# Patient Record
Sex: FEMALE | Race: WHITE | Employment: OTHER | URBAN - METROPOLITAN AREA
[De-identification: names, ages, dates, MRNs, and addresses within clinical notes are randomized per-mention and may not be internally consistent; named-entity substitution may affect disease eponyms.]

---

## 2017-04-10 ENCOUNTER — HOSPITAL LABORATORY (OUTPATIENT)
Dept: OTHER | Facility: CLINIC | Age: 67
End: 2017-04-10

## 2017-04-10 LAB
CREAT SERPL-MCNC: 0.72 MG/DL (ref 0.52–1.04)
ERYTHROCYTE [DISTWIDTH] IN BLOOD BY AUTOMATED COUNT: 17.7 % (ref 10–15)
ERYTHROCYTE [DISTWIDTH] IN BLOOD BY AUTOMATED COUNT: NORMAL % (ref 10–15)
FERRITIN SERPL-MCNC: 5 NG/ML (ref 8–252)
GFR SERPL CREATININE-BSD FRML MDRD: 82 ML/MIN/1.7M2
HCT VFR BLD AUTO: 28.1 % (ref 35–47)
HCT VFR BLD AUTO: NORMAL % (ref 35–47)
HGB BLD-MCNC: 8.2 G/DL (ref 11.7–15.7)
HGB BLD-MCNC: NORMAL G/DL (ref 11.7–15.7)
HGB BLD-MCNC: NORMAL G/DL (ref 11.7–15.7)
IRON SATN MFR SERPL: 3 % (ref 15–46)
IRON SERPL-MCNC: 11 UG/DL (ref 35–180)
MCH RBC QN AUTO: 25.4 PG (ref 26.5–33)
MCH RBC QN AUTO: NORMAL PG (ref 26.5–33)
MCHC RBC AUTO-ENTMCNC: 29.5 G/DL (ref 31.5–36.5)
MCHC RBC AUTO-ENTMCNC: NORMAL G/DL (ref 31.5–36.5)
MCV RBC AUTO: 86 FL (ref 78–100)
MCV RBC AUTO: NORMAL FL (ref 78–100)
PLATELET # BLD AUTO: 368 10E9/L (ref 150–450)
PLATELET # BLD AUTO: NORMAL 10E9/L (ref 150–450)
RBC # BLD AUTO: 3.27 10E12/L (ref 3.8–5.2)
RBC # BLD AUTO: NORMAL 10E12/L (ref 3.8–5.2)
RETICS # AUTO: 56.6 10E9/L (ref 25–95)
RETICS # AUTO: NORMAL 10E9/L (ref 25–95)
RETICS/RBC NFR AUTO: 1.7 % (ref 0.5–2)
RETICS/RBC NFR AUTO: NORMAL % (ref 0.5–2)
TIBC SERPL-MCNC: 431 UG/DL (ref 240–430)
WBC # BLD AUTO: 5.5 10E9/L (ref 4–11)
WBC # BLD AUTO: NORMAL 10E9/L (ref 4–11)

## 2017-04-11 ENCOUNTER — TRANSFERRED RECORDS (OUTPATIENT)
Dept: HEALTH INFORMATION MANAGEMENT | Facility: CLINIC | Age: 67
End: 2017-04-11

## 2017-04-13 DIAGNOSIS — D64.9 ANEMIA, UNSPECIFIED: Primary | ICD-10-CM

## 2017-04-13 PROBLEM — K91.2 POSTSURGICAL NONABSORPTION: Status: ACTIVE | Noted: 2017-04-13

## 2017-04-13 PROBLEM — Z29.89 NEED FOR PROPHYLACTIC IMMUNOTHERAPY: Status: ACTIVE | Noted: 2017-04-13

## 2017-04-13 PROBLEM — K90.9 IMPAIRED INTESTINAL ABSORPTION: Status: ACTIVE | Noted: 2017-04-13

## 2017-04-15 ENCOUNTER — INFUSION THERAPY VISIT (OUTPATIENT)
Dept: INFUSION THERAPY | Facility: CLINIC | Age: 67
End: 2017-04-15
Attending: PATHOLOGY
Payer: MEDICARE

## 2017-04-15 VITALS — HEART RATE: 54 BPM | DIASTOLIC BLOOD PRESSURE: 67 MMHG | SYSTOLIC BLOOD PRESSURE: 134 MMHG | RESPIRATION RATE: 18 BRPM

## 2017-04-15 DIAGNOSIS — K90.9 IMPAIRED INTESTINAL ABSORPTION: ICD-10-CM

## 2017-04-15 DIAGNOSIS — K91.2 POSTSURGICAL NONABSORPTION: ICD-10-CM

## 2017-04-15 DIAGNOSIS — D64.9 ANEMIA, UNSPECIFIED: Primary | ICD-10-CM

## 2017-04-15 PROCEDURE — 25000128 H RX IP 250 OP 636: Performed by: PATHOLOGY

## 2017-04-15 PROCEDURE — 96365 THER/PROPH/DIAG IV INF INIT: CPT

## 2017-04-15 RX ADMIN — FERRIC CARBOXYMALTOSE INJECTION 750 MG: 50 INJECTION, SOLUTION INTRAVENOUS at 12:52

## 2017-04-15 RX ADMIN — SODIUM CHLORIDE 250 ML: 9 INJECTION, SOLUTION INTRAVENOUS at 12:52

## 2017-04-15 NOTE — MR AVS SNAPSHOT
After Visit Summary   4/15/2017    Amalia Britton    MRN: [id number]           Patient Information     Date Of Birth          1950        Visit Information        Provider Department      4/15/2017 12:30 PM  INFUSION CHAIR 16 [facility]        Today's Diagnoses     Anemia, unspecified    -  1    Impaired intestinal absorption        Postsurgical nonabsorption           Follow-ups after your visit        Your next 10 appointments already scheduled     Apr 16, 2017 11:30 AM CDT   Level 1 with  INFUSION CHAIR 16   [facility] ([facility])    [facility]  [address]  [place] [address]   [phone]            Apr 22, 2017 11:30 AM CDT   Level 2 with  INFUSION CHAIR 12   [facility] ([facility])    [facility]  [address]  [place] [id number] [phone]              Who to contact     If you have questions or need follow up information about today's clinic visit or your schedule please contact [facility] AND [facility] directly at [phone].  Normal or non-critical lab and imaging results will be communicated to you by [employer], letter or phone within 4 business days after the clinic has received the results. If you do not hear from us within 7 days, please contact the clinic through [employer] or phone. If you have a critical or abnormal lab result, we will notify you by phone as soon as possible.  Submit refill requests through [employer] or call your pharmacy and they will forward the refill request to us. Please allow 3 business days for your refill to be completed.          Additional Information About Your Visit        MyChart Information     [employer] lets you send messages to your doctor, view your test results, renew your prescriptions, schedule appointments and more. To sign  "up, go to [email or web address] . Click on \"Log in\" on the left side of the screen, which will take you to the Welcome page. Then click on \"Sign up Now\" on the right side of the page.     You will be asked to enter the access code listed below, as well as some personal information. Please follow the directions to create your username and password.     Your access code is: [email or web address]  Expires: 2017  1:44 PM     Your access code will  in 90 days. If you need help or a new code, please call your [facility] clinic or [phone].        Care EveryWhere ID     This is your Care EveryWhere ID. This could be used by other organizations to access your [facility] medical records  [id number]        Your Vitals Were     Pulse Respirations                54 18           Blood Pressure from Last 3 Encounters:   04/15/17 134/67   14 112/63   14 146/77    Weight from Last 3 Encounters:   14 64.2 kg (141 lb 9.6 oz)   14 66.9 kg (147 lb 8 oz)              We Performed the Following     Treatment Conditions        Primary Care Provider Office Phone # Fax #    Robin Martin [phone]       [facility] [address] [place] [id number]        Thank you!     Thank you for choosing [facility] AND [facility]  for your care. Our goal is always to provide you with excellent care. Hearing back from our patients is one way we can continue to improve our services. Please take a few minutes to complete the written survey that you may receive in the mail after your visit with us. Thank you!             Your Updated Medication List - Protect others around you: Learn how to safely use, store and throw away your medicines at www.disposemymeds.org.          This list is accurate as of: 4/15/17  1:45 PM.  Always use your most recent med list.                   Brand Name Dispense Instructions for use    baclofen 10 MG tablet    LIORESAL     Take 15 mg by " mouth 2 times daily 1.5 tab three times /day       CYANOCOBALAMIN PO      Take 1,000 mcg by mouth daily       EPINEPHrine 0.3 MG/0.3ML injection      Inject 0.3 mg into the muscle once as needed for anaphylaxis       ESTRACE PO      Take 1 mg by mouth daily       FOSAMAX PO      Take 70 mg by mouth once a week       HYDROcodone-acetaminophen 5-325 MG per tablet    NORCO    25 tablet    Take 1 tablet by mouth every 6 hours as needed for moderate to severe pain       LAMICTAL PO      Take 100 mg by mouth 2 times daily       Multi-vitamin Tabs tablet      Take 1 tablet by mouth daily       oxyCODONE-acetaminophen 5-325 MG per tablet    PERCOCET    30 tablet    Take 1-2 tablets by mouth every 6 hours as needed for moderate to severe pain       SYNTHROID PO      Take 100 mcg by mouth daily       TRAZODONE HCL PO      Take 200 mg by mouth At Bedtime       VALIUM PO      Take 10 mg by mouth once       VITAMIN C PO      Take 1,000 mg by mouth daily       vitamin D 69463 UNIT capsule    ERGOCALCIFEROL     Take 50,000 Units by mouth every 7 days

## 2017-04-15 NOTE — PROGRESS NOTES
Infusion Nursing Note:  Amalia Britton presents today for 1st injectafer.    Patient seen by provider today: No   present during visit today: Not Applicable.    Note: Reviewed injectafer and procrit drug information, possible side effects.  Consents signed for both injectafer and procrit.    Spoke with rj Palmer to administer procrit tomorrow.    Intravenous Access:  Peripheral IV placed.    Treatment Conditions:  Not Applicable.      Post Infusion Assessment:  Patient tolerated infusion without incident.  Patient observed for 30 minutes post injectafer per protocol.  Blood return noted pre and post infusion.  Site patent and intact, free from redness, edema or discomfort.  No evidence of extravasations.  Access discontinued per protocol.    Discharge Plan:   Discharge instructions reviewed with: Patient.  Patient and/or family verbalized understanding of discharge instructions and all questions answered.  Copy of AVS reviewed with patient and/or family.  Patient will return tomorrow (4/16/17) for next appointment.  Patient discharged in stable condition accompanied by: self.  Departure Mode: Ambulatory.    CASSY Epps RN (discharge)

## 2017-04-16 ENCOUNTER — INFUSION THERAPY VISIT (OUTPATIENT)
Dept: INFUSION THERAPY | Facility: CLINIC | Age: 67
End: 2017-04-16
Attending: PATHOLOGY
Payer: MEDICARE

## 2017-04-16 VITALS — DIASTOLIC BLOOD PRESSURE: 65 MMHG | SYSTOLIC BLOOD PRESSURE: 117 MMHG | HEART RATE: 80 BPM | TEMPERATURE: 98.1 F

## 2017-04-16 DIAGNOSIS — D64.9 ANEMIA, UNSPECIFIED: Primary | ICD-10-CM

## 2017-04-16 DIAGNOSIS — Z29.89 NEED FOR PROPHYLACTIC IMMUNOTHERAPY: ICD-10-CM

## 2017-04-16 PROCEDURE — 63400005 ZZH RX 634: Performed by: PATHOLOGY

## 2017-04-16 PROCEDURE — 96372 THER/PROPH/DIAG INJ SC/IM: CPT | Mod: XS

## 2017-04-16 PROCEDURE — 96360 HYDRATION IV INFUSION INIT: CPT

## 2017-04-16 RX ADMIN — ERYTHROPOIETIN 40000 UNITS: 40000 INJECTION, SOLUTION INTRAVENOUS; SUBCUTANEOUS at 12:01

## 2017-04-16 ASSESSMENT — PAIN SCALES - GENERAL: PAINLEVEL: EXTREME PAIN (8)

## 2017-04-16 NOTE — PROGRESS NOTES
Infusion Nursing Note:  Amalia Britton presents today for procrit.    Patient seen by provider today: No   present during visit today: Not Applicable.    Note: N/A.    Intravenous Access:  No Intravenous access/labs at this visit.    Treatment Conditions:   consent signed 4/15/17.      Post Infusion Assessment:  Patient tolerated injection without incident.  Site patent and intact, free from redness, edema or discomfort.    Discharge Plan:   Copy of AVS reviewed with patient and/or family. Patient discharged in stable condition accompanied by: self.  Departure Mode: Ambulatory with cane.    Loreto Fletcher RN

## 2017-04-16 NOTE — MR AVS SNAPSHOT
"              After Visit Summary   4/16/2017    Amalia Britton    MRN: [id number]           Patient Information     Date Of Birth          1950        Visit Information        Provider Department      4/16/2017 11:30 AM  INFUSION CHAIR 16 [facility]        Today's Diagnoses     Anemia, unspecified    -  1    Need for prophylactic immunotherapy           Follow-ups after your visit        Your next 10 appointments already scheduled     Apr 22, 2017 11:30 AM CDT   Level 2 with  INFUSION CHAIR 12   [facility] ([facility])    [facility] [place]  [address]  [place] [id number] [phone]              Who to contact     If you have questions or need follow up information about today's clinic visit or your schedule please contact [facility] directly at [phone].  Normal or non-critical lab and imaging results will be communicated to you by [employer], letter or phone within 4 business days after the clinic has received the results. If you do not hear from us within 7 days, please contact the clinic through [employer] or phone. If you have a critical or abnormal lab result, we will notify you by phone as soon as possible.  Submit refill requests through [employer] or call your pharmacy and they will forward the refill request to us. Please allow 3 business days for your refill to be completed.          Additional Information About Your Visit        MyChart Information     [employer] lets you send messages to your doctor, view your test results, renew your prescriptions, schedule appointments and more. To sign up, go to [email or web address] . Click on \"Log in\" on the left side of the screen, which will take you to the Welcome page. Then click on \"Sign up Now\" on the right side of the page.     You will be asked to enter the access code listed below, as well as some personal " information. Please follow the directions to create your username and password.     Your access code is: [email or web address]  Expires: 2017  1:44 PM     Your access code will  in 90 days. If you need help or a new code, please call your [facility] or [phone].        Care EveryWhere ID     This is your Care EveryWhere ID. This could be used by other organizations to access your [facility] medical records  [id number]        Your Vitals Were     Pulse Temperature                80 98.1  F (36.7  C) (Oral)           Blood Pressure from Last 3 Encounters:   17 117/65   04/15/17 134/67   14 112/63    Weight from Last 3 Encounters:   14 64.2 kg (141 lb 9.6 oz)   14 66.9 kg (147 lb 8 oz)              Today, you had the following     No orders found for display       Primary Care Provider Office Phone # Fax #    Robin Martin [phone]       [facility] [address] [facility] [address]        Thank you!     Thank you for choosing [facility]  for your care. Our goal is always to provide you with excellent care. Hearing back from our patients is one way we can continue to improve our services. Please take a few minutes to complete the written survey that you may receive in the mail after your visit with us. Thank you!             Your Updated Medication List - Protect others around you: Learn how to safely use, store and throw away your medicines at www.disposemymeds.org.          This list is accurate as of: 17 12:04 PM.  Always use your most recent med list.                   Brand Name Dispense Instructions for use    baclofen 10 MG tablet    LIORESAL     Take 15 mg by mouth 2 times daily 1.5 tab three times /day       CYANOCOBALAMIN PO      Take 1,000 mcg by mouth daily       EPINEPHrine 0.3 MG/0.3ML injection      Inject 0.3 mg into the muscle once as needed for anaphylaxis       ESTRACE PO      Take 1 mg by  mouth daily       FOSAMAX PO      Take 70 mg by mouth once a week       HYDROcodone-acetaminophen 5-325 MG per tablet    NORCO    25 tablet    Take 1 tablet by mouth every 6 hours as needed for moderate to severe pain       LAMICTAL PO      Take 100 mg by mouth 2 times daily       Multi-vitamin Tabs tablet      Take 1 tablet by mouth daily       oxyCODONE-acetaminophen 5-325 MG per tablet    PERCOCET    30 tablet    Take 1-2 tablets by mouth every 6 hours as needed for moderate to severe pain       SYNTHROID PO      Take 100 mcg by mouth daily       TRAZODONE HCL PO      Take 200 mg by mouth At Bedtime       VALIUM PO      Take 10 mg by mouth once       VITAMIN C PO      Take 1,000 mg by mouth daily       vitamin D 26863 UNIT capsule    ERGOCALCIFEROL     Take 50,000 Units by mouth every 7 days

## 2017-04-22 ENCOUNTER — INFUSION THERAPY VISIT (OUTPATIENT)
Dept: INFUSION THERAPY | Facility: CLINIC | Age: 67
End: 2017-04-22
Attending: PATHOLOGY
Payer: MEDICARE

## 2017-04-22 VITALS
TEMPERATURE: 97.2 F | SYSTOLIC BLOOD PRESSURE: 124 MMHG | HEART RATE: 97 BPM | RESPIRATION RATE: 14 BRPM | DIASTOLIC BLOOD PRESSURE: 55 MMHG

## 2017-04-22 DIAGNOSIS — K90.9 IMPAIRED INTESTINAL ABSORPTION: ICD-10-CM

## 2017-04-22 DIAGNOSIS — K91.2 POSTSURGICAL NONABSORPTION: ICD-10-CM

## 2017-04-22 DIAGNOSIS — D64.9 ANEMIA, UNSPECIFIED: Primary | ICD-10-CM

## 2017-04-22 PROCEDURE — 25000128 H RX IP 250 OP 636: Performed by: PATHOLOGY

## 2017-04-22 RX ADMIN — SODIUM CHLORIDE 250 ML: 9 INJECTION, SOLUTION INTRAVENOUS at 12:05

## 2017-04-22 RX ADMIN — FERRIC CARBOXYMALTOSE INJECTION 750 MG: 50 INJECTION, SOLUTION INTRAVENOUS at 12:05

## 2017-04-22 ASSESSMENT — PAIN SCALES - GENERAL: PAINLEVEL: WORST PAIN (10)

## 2017-04-22 NOTE — PROGRESS NOTES
Infusion Nursing Note:  Amalia Britton presents today for last dose Injectafer.    Patient seen by provider today: No   present during visit today: Not Applicable.    Note: Only change to report is recent rupture of Bakers cyst behind left knee with pain and swelling. Was seen in the ER for this last week..    Intravenous Access:  Peripheral IV placed.    Treatment Conditions:  Not Applicable.      Post Infusion Assessment:  Patient tolerated infusion without incident.  Patient observed for 30 minutes post Injectafer per protocol.  Blood return noted pre and post infusion.  Site patent and intact, free from redness, edema or discomfort.  No evidence of extravasations.  Access discontinued per protocol.    Discharge Plan:   Discharge instructions reviewed with: Patient.  Patient and/or family verbalized understanding of discharge instructions and all questions answered.  Copy of AVS reviewed with patient and/or family.  Patient will return NONE NEEDED for next appointment.  Patient discharged in stable condition accompanied by: self.  Departure Mode: Ambulatory.    Tamiko Hogan RN

## 2017-04-22 NOTE — MR AVS SNAPSHOT
"              After Visit Summary   4/22/2017    Amalia Britton    MRN: [id number]           Patient Information     Date Of Birth          1950        Visit Information        Provider Department      4/22/2017 11:30 AM  INFUSION CHAIR 12 [facility]        Today's Diagnoses     Anemia, unspecified    -  1    Impaired intestinal absorption        Postsurgical nonabsorption           Follow-ups after your visit        Your next 10 appointments already scheduled     Tyshawn 15, 2017   Procedure with Balta Hoffman MD   [facility] (--)    [address]  [facility] [address] [phone]              Who to contact     If you have questions or need follow up information about today's clinic visit or your schedule please contact [facility] directly at [phone].  Normal or non-critical lab and imaging results will be communicated to you by [employer], letter or phone within 4 business days after the clinic has received the results. If you do not hear from us within 7 days, please contact the clinic through [employer] or phone. If you have a critical or abnormal lab result, we will notify you by phone as soon as possible.  Submit refill requests through [employer] or call your pharmacy and they will forward the refill request to us. Please allow 3 business days for your refill to be completed.          Additional Information About Your Visit        MyChart Information     [employer] lets you send messages to your doctor, view your test results, renew your prescriptions, schedule appointments and more. To sign up, go to [email or web address] . Click on \"Log in\" on the left side of the screen, which will take you to the Welcome page. Then click on \"Sign up Now\" on the right side of the page.     You will be asked to enter the access code listed below, as well as some personal information. Please follow the directions to " create your username and password.     Your access code is: [email or web address]  Expires: 2017  1:44 PM     Your access code will  in 90 days. If you need help or a new code, please call your [facility] or [phone].        Care EveryWhere ID     This is your Care EveryWhere ID. This could be used by other organizations to access your [facility] medical records  [id number]        Your Vitals Were     Pulse Temperature Respirations             98 98.4  F (36.9  C) (Oral) 18          Blood Pressure from Last 3 Encounters:   17 141/66   17 117/65   04/15/17 134/67    Weight from Last 3 Encounters:   14 64.2 kg (141 lb 9.6 oz)   14 66.9 kg (147 lb 8 oz)              We Performed the Following     Treatment Conditions        Primary Care Provider Office Phone # Fax #    Robin Martin [phone]       [facility] [address] [facility] [address]        Thank you!     Thank you for choosing [facility]  for your care. Our goal is always to provide you with excellent care. Hearing back from our patients is one way we can continue to improve our services. Please take a few minutes to complete the written survey that you may receive in the mail after your visit with us. Thank you!             Your Updated Medication List - Protect others around you: Learn how to safely use, store and throw away your medicines at www.disposemymeds.org.          This list is accurate as of: 17  1:12 PM.  Always use your most recent med list.                   Brand Name Dispense Instructions for use    baclofen 10 MG tablet    LIORESAL     Take 15 mg by mouth 2 times daily 1.5 tab three times /day       CYANOCOBALAMIN PO      Take 1,000 mcg by mouth daily       EPINEPHrine 0.3 MG/0.3ML injection      Inject 0.3 mg into the muscle once as needed for anaphylaxis       ESTRACE PO      Take 1 mg by mouth daily       FOSAMAX PO      Take  70 mg by mouth once a week       HYDROcodone-acetaminophen 5-325 MG per tablet    NORCO    25 tablet    Take 1 tablet by mouth every 6 hours as needed for moderate to severe pain       LAMICTAL PO      Take 100 mg by mouth 2 times daily       Multi-vitamin Tabs tablet      Take 1 tablet by mouth daily       oxyCODONE-acetaminophen 5-325 MG per tablet    PERCOCET    30 tablet    Take 1-2 tablets by mouth every 6 hours as needed for moderate to severe pain       SYNTHROID PO      Take 100 mcg by mouth daily       TRAZODONE HCL PO      Take 200 mg by mouth At Bedtime       VALIUM PO      Take 10 mg by mouth once       VITAMIN C PO      Take 1,000 mg by mouth daily       vitamin D 21909 UNIT capsule    ERGOCALCIFEROL     Take 50,000 Units by mouth every 7 days

## 2017-05-23 ENCOUNTER — TRANSFERRED RECORDS (OUTPATIENT)
Dept: HEALTH INFORMATION MANAGEMENT | Facility: CLINIC | Age: 67
End: 2017-05-23

## 2017-05-25 ENCOUNTER — TELEPHONE (OUTPATIENT)
Dept: ONCOLOGY | Facility: CLINIC | Age: 67
End: 2017-05-25

## 2017-05-26 DIAGNOSIS — Z29.89 NEED FOR PROPHYLACTIC IMMUNOTHERAPY: Primary | ICD-10-CM

## 2017-05-26 PROBLEM — T45.4X5A ADVERSE EFFECT OF IRON OR ITS COMPOUND: Status: ACTIVE | Noted: 2017-05-26

## 2017-06-03 ENCOUNTER — INFUSION THERAPY VISIT (OUTPATIENT)
Dept: INFUSION THERAPY | Facility: CLINIC | Age: 67
End: 2017-06-03
Attending: PATHOLOGY
Payer: MEDICARE

## 2017-06-03 VITALS
TEMPERATURE: 97.9 F | SYSTOLIC BLOOD PRESSURE: 122 MMHG | HEART RATE: 82 BPM | DIASTOLIC BLOOD PRESSURE: 76 MMHG | RESPIRATION RATE: 14 BRPM

## 2017-06-03 DIAGNOSIS — K90.9 IMPAIRED INTESTINAL ABSORPTION: ICD-10-CM

## 2017-06-03 DIAGNOSIS — D64.9 ANEMIA, UNSPECIFIED: ICD-10-CM

## 2017-06-03 PROCEDURE — 25000128 H RX IP 250 OP 636: Performed by: PATHOLOGY

## 2017-06-03 PROCEDURE — 96365 THER/PROPH/DIAG IV INF INIT: CPT

## 2017-06-03 RX ORDER — SERTRALINE HYDROCHLORIDE 100 MG/1
200 TABLET, FILM COATED ORAL DAILY
COMMUNITY

## 2017-06-03 RX ORDER — ASCORBIC ACID 500 MG
1000 TABLET ORAL 3 TIMES DAILY
COMMUNITY
End: 2017-06-03 | Stop reason: DRUGHIGH

## 2017-06-03 RX ORDER — TRAZODONE HYDROCHLORIDE 100 MG/1
200 TABLET ORAL AT BEDTIME
COMMUNITY

## 2017-06-03 RX ADMIN — FERRIC CARBOXYMALTOSE INJECTION 750 MG: 50 INJECTION, SOLUTION INTRAVENOUS at 11:12

## 2017-06-03 RX ADMIN — SODIUM CHLORIDE 250 ML: 9 INJECTION, SOLUTION INTRAVENOUS at 11:12

## 2017-06-03 ASSESSMENT — PAIN SCALES - GENERAL: PAINLEVEL: NO PAIN (0)

## 2017-06-03 NOTE — MR AVS SNAPSHOT
After Visit Summary   6/3/2017    Amalia Britton    MRN: [id number]           Patient Information     Date Of Birth          1950        Visit Information        Provider Department      6/3/2017 11:00 AM  INFUSION CHAIR 13 [facility]        Today's Diagnoses     Adverse effect of iron or its compound, initial encounter    -  1    Anemia, unspecified        Impaired intestinal absorption           Follow-ups after your visit        Your next 10 appointments already scheduled     Jun 04, 2017 11:00 AM CDT   Level 1 with  INFUSION CHAIR 16   [facility] ([facility])    n [facility]  [address]  [facility] [id number] [phone]            Tyshawn 15, 2017   Procedure with Balta Hoffman MD   [facility] PeriOP Services (--)    [address]  [facility] [address] [phone]              Who to contact     If you have questions or need follow up information about today's clinic visit or your schedule please contact [facility] directly at [phone].  Normal or non-critical lab and imaging results will be communicated to you by [employer], letter or phone within 4 business days after the clinic has received the results. If you do not hear from us within 7 days, please contact the clinic through [employer] or phone. If you have a critical or abnormal lab result, we will notify you by phone as soon as possible.  Submit refill requests through [employer] or call your pharmacy and they will forward the refill request to us. Please allow 3 business days for your refill to be completed.          Additional Information About Your Visit        MyChart Information     [employer] lets you send messages to your doctor, view your test results, renew your prescriptions, schedule appointments and more. To sign up, go to [email or web address] . Click on  "\"Log in\" on the left side of the screen, which will take you to the Welcome page. Then click on \"Sign up Now\" on the right side of the page.     You will be asked to enter the access code listed below, as well as some personal information. Please follow the directions to create your username and password.     Your access code is: [email or web address]  Expires: 2017  1:44 PM     Your access code will  in 90 days. If you need help or a new code, please call your [place] clinic or [phone].        Care EveryWhere ID     This is your Care EveryWhere ID. This could be used by other organizations to access your [place] medical records  [id number]        Your Vitals Were     Pulse Temperature Respirations             76 97.9  F (36.6  C) (Oral) 14          Blood Pressure from Last 3 Encounters:   17 122/73   17 124/55   17 117/65    Weight from Last 3 Encounters:   14 64.2 kg (141 lb 9.6 oz)   14 66.9 kg (147 lb 8 oz)              Today, you had the following     No orders found for display         Today's Medication Changes          These changes are accurate as of: 6/3/17 11:54 AM.  If you have any questions, ask your nurse or doctor.               These medicines have changed or have updated prescriptions.        Dose/Directions    ascorbic acid 1000 MG Tabs tablet   This may have changed:  Another medication with the same name was removed. Continue taking this medication, and follow the directions you see here.        Dose:  1000 mg   Take 1,000 mg by mouth daily TAke 2 1000mg tabs three times daily.   Refills:  0       CYANOCOBALAMIN PO   This may have changed:  Another medication with the same name was removed. Continue taking this medication, and follow the directions you see here.        Dose:  1000 mcg   Take 1,000 mcg by mouth At Bedtime   Refills:  0       LEVOTHROID PO   Indication:  Underactive Thyroid   This may have changed:  Another medication with the same name was " removed. Continue taking this medication, and follow the directions you see here.        Dose:  112 mcg   Take 112 mcg by mouth At Bedtime   Refills:  0       SERTRALINE HCL PO   This may have changed:  Another medication with the same name was removed. Continue taking this medication, and follow the directions you see here.        Dose:  100 mg   Take 100 mg by mouth daily TAkes 1.5 tablets in the am.   Refills:  0       TRAZODONE HCL PO   Indication:  Trouble Sleeping   This may have changed:  Another medication with the same name was removed. Continue taking this medication, and follow the directions you see here.        Dose:  100 mg   Take 100 mg by mouth At Bedtime   Refills:  0         Stop taking these medicines if you haven't already. Please contact your care team if you have questions.     VITAMIN C PO                    Primary Care Provider Office Phone # Fax #    Robin Martin [phone]       [facility] [address] [facility] [address]        Thank you!     Thank you for choosing [facility] [place]  for your care. Our goal is always to provide you with excellent care. Hearing back from our patients is one way we can continue to improve our services. Please take a few minutes to complete the written survey that you may receive in the mail after your visit with us. Thank you!             Your Updated Medication List - Protect others around you: Learn how to safely use, store and throw away your medicines at www.disposemymeds.org.          This list is accurate as of: 6/3/17 11:54 AM.  Always use your most recent med list.                   Brand Name Dispense Instructions for use    ACETAMINOPHEN PO      Take 500 mg by mouth every 8 hours as needed for pain 2 tabs 3 times daily       ascorbic acid 1000 MG Tabs tablet      Take 1,000 mg by mouth daily TAke 2 1000mg tabs three times daily.       CYANOCOBALAMIN PO      Take 1,000 mcg by mouth  At Bedtime       EPINEPHrine 0.3 MG/0.3ML injection      Inject 0.3 mg into the muscle once as needed for anaphylaxis       FOSAMAX PO      Take 70 mg by mouth once a week       LAMICTAL PO      Take 100 mg by mouth 2 times daily       LEVOTHROID PO      Take 112 mcg by mouth At Bedtime       Multi-vitamin Tabs tablet      Take 1 tablet by mouth daily       NEURONTIN PO      Take 300 mg by mouth 3 times daily Take 2 capsules 3x daily       SERTRALINE HCL PO      Take 100 mg by mouth daily TAkes 1.5 tablets in the am.       TRAZODONE HCL PO      Take 100 mg by mouth At Bedtime       VALTREX PO      Take 1,000 mg by mouth daily       VITAMIN D (CHOLECALCIFEROL) PO      Take 2,000 Units by mouth daily

## 2017-06-03 NOTE — PROGRESS NOTES
Infusion Nursing Note:  Amalia Britton presents today for Injectafer.    Patient seen by provider today: No   present during visit today: Not Applicable.    Note: Having Left knee surgery in a few weeks.     Intravenous Access:  Peripheral IV placed.    Treatment Conditions:  Not Applicable.      Post Infusion Assessment:  Patient tolerated infusion without incident.  Patient observed for 30 minutes post INjectafer per protocol.  Blood return noted pre and post infusion.  Site patent and intact, free from redness, edema or discomfort.  No evidence of extravasations.  Access discontinued per protocol.    Discharge Plan:   Discharge instructions reviewed with: Patient.  Patient and/or family verbalized understanding of discharge instructions and all questions answered.  Copy of AVS reviewed with patient and/or family.  Patient will return 6-4-2017 for next appointment.  Patient discharged in stable condition accompanied by: self.  Departure Mode: Ambulatory.    Bambi Estrada RN

## 2017-06-04 ENCOUNTER — INFUSION THERAPY VISIT (OUTPATIENT)
Dept: INFUSION THERAPY | Facility: CLINIC | Age: 67
End: 2017-06-04
Attending: PATHOLOGY
Payer: MEDICARE

## 2017-06-04 VITALS
TEMPERATURE: 97.6 F | SYSTOLIC BLOOD PRESSURE: 117 MMHG | DIASTOLIC BLOOD PRESSURE: 76 MMHG | HEART RATE: 79 BPM | RESPIRATION RATE: 14 BRPM

## 2017-06-04 DIAGNOSIS — Z29.89 NEED FOR PROPHYLACTIC IMMUNOTHERAPY: ICD-10-CM

## 2017-06-04 DIAGNOSIS — D64.9 ANEMIA, UNSPECIFIED: Primary | ICD-10-CM

## 2017-06-04 PROCEDURE — 96372 THER/PROPH/DIAG INJ SC/IM: CPT

## 2017-06-04 PROCEDURE — 25000128 H RX IP 250 OP 636: Performed by: PATHOLOGY

## 2017-06-04 RX ADMIN — ERYTHROPOIETIN 40000 UNITS: 40000 INJECTION, SOLUTION INTRAVENOUS; SUBCUTANEOUS at 11:25

## 2017-06-04 ASSESSMENT — PAIN SCALES - GENERAL: PAINLEVEL: NO PAIN (0)

## 2017-06-04 NOTE — MR AVS SNAPSHOT
"              After Visit Summary   6/4/2017    Amalia Britton    MRN: [id number]           Patient Information     Date Of Birth          1950        Visit Information        Provider Department      6/4/2017 11:00 AM  INFUSION CHAIR 16 [facility] [place] [facility]        Today's Diagnoses     Anemia, unspecified    -  1    Need for prophylactic immunotherapy           Follow-ups after your visit        Follow-up notes from your care team     Return if symptoms worsen or fail to improve.      Your next 10 appointments already scheduled     Tyshawn 15, 2017   Procedure with Balta Hoffman MD   [facility] (--)    [address]  [facility] [address] [phone]              Who to contact     If you have questions or need follow up information about today's clinic visit or your schedule please contact [facility] directly at [phone].  Normal or non-critical lab and imaging results will be communicated to you by MyChart, letter or phone within 4 business days after the clinic has received the results. If you do not hear from us within 7 days, please contact the clinic through MyChart or phone. If you have a critical or abnormal lab result, we will notify you by phone as soon as possible.  Submit refill requests through [employer] or call your pharmacy and they will forward the refill request to us. Please allow 3 business days for your refill to be completed.          Additional Information About Your Visit        MyChart Information     [employer] lets you send messages to your doctor, view your test results, renew your prescriptions, schedule appointments and more. To sign up, go to [email or web address] . Click on \"Log in\" on the left side of the screen, which will take you to the Welcome page. Then click on \"Sign up Now\" on the right side of the page.     You will be asked to enter the access code listed below, as well as some " personal information. Please follow the directions to create your username and password.     Your access code is: [email or web address]  Expires: 2017  1:44 PM     Your access code will  in 90 days. If you need help or a new code, please call your [facility] or [phone].        Care EveryWhere ID     This is your Care EveryWhere ID. This could be used by other organizations to access your [place] medical records  [id number]        Your Vitals Were     Pulse Temperature Respirations             79 97.6  F (36.4  C) (Axillary) 14          Blood Pressure from Last 3 Encounters:   17 117/76   17 122/76   17 124/55    Weight from Last 3 Encounters:   14 64.2 kg (141 lb 9.6 oz)   14 66.9 kg (147 lb 8 oz)              Today, you had the following     No orders found for display       Primary Care Provider Office Phone # Fax #    Robin Martin [phone]       [facility] [address] [facility] [address]        Thank you!     Thank you for choosing [facility]  for your care. Our goal is always to provide you with excellent care. Hearing back from our patients is one way we can continue to improve our services. Please take a few minutes to complete the written survey that you may receive in the mail after your visit with us. Thank you!             Your Updated Medication List - Protect others around you: Learn how to safely use, store and throw away your medicines at www.disposemymeds.org.          This list is accurate as of: 17 11:29 AM.  Always use your most recent med list.                   Brand Name Dispense Instructions for use    ACETAMINOPHEN PO      Take 500 mg by mouth every 8 hours as needed for pain 2 tabs 3 times daily       ascorbic acid 1000 MG Tabs tablet      Take 1,000 mg by mouth daily TAke 2 1000mg tabs three times daily.       CYANOCOBALAMIN PO      Take 1,000 mcg by mouth At Bedtime        EPINEPHrine 0.3 MG/0.3ML injection      Inject 0.3 mg into the muscle once as needed for anaphylaxis       FOSAMAX PO      Take 70 mg by mouth once a week       LAMICTAL PO      Take 100 mg by mouth 2 times daily       LEVOTHROID PO      Take 112 mcg by mouth At Bedtime       Multi-vitamin Tabs tablet      Take 1 tablet by mouth daily       NEURONTIN PO      Take 300 mg by mouth 3 times daily Take 2 capsules 3x daily       SERTRALINE HCL PO      Take 100 mg by mouth daily TAkes 1.5 tablets in the am.       TRAZODONE HCL PO      Take 100 mg by mouth At Bedtime       VALTREX PO      Take 1,000 mg by mouth daily       VITAMIN D (CHOLECALCIFEROL) PO      Take 2,000 Units by mouth daily

## 2017-06-04 NOTE — PROGRESS NOTES
Infusion Nursing Note:  Amalia Britton presents today for Procrit.    Patient seen by provider today: No   present during visit today: Not Applicable.    Note: N/A.    Intravenous Access:  No Intravenous access/labs at this visit.    Treatment Conditions:  Not Applicable.      Post Infusion Assessment:  Patient tolerated injection without incident.  Site patent and intact, free from redness, edema or discomfort.    Discharge Plan:   Discharge instructions reviewed with: Patient.  Patient and/or family verbalized understanding of discharge instructions and all questions answered.  Copy of AVS reviewed with patient and/or family.  Patient will return prn for next appointment.  Patient discharged in stable condition accompanied by: self.  Departure Mode: Ambulatory.    Bambi Estrada RN

## 2017-06-06 ENCOUNTER — TRANSFERRED RECORDS (OUTPATIENT)
Dept: HEALTH INFORMATION MANAGEMENT | Facility: CLINIC | Age: 67
End: 2017-06-06

## 2017-06-14 RX ORDER — VANCOMYCIN HYDROCHLORIDE 1 G/200ML
1000 INJECTION, SOLUTION INTRAVENOUS
Status: CANCELLED | OUTPATIENT
Start: 2017-06-14

## 2017-06-14 RX ORDER — CLINDAMYCIN PHOSPHATE 900 MG/50ML
900 INJECTION, SOLUTION INTRAVENOUS
Status: CANCELLED | OUTPATIENT
Start: 2017-06-14

## 2017-06-14 RX ORDER — CLINDAMYCIN PHOSPHATE 900 MG/50ML
900 INJECTION, SOLUTION INTRAVENOUS SEE ADMIN INSTRUCTIONS
Status: CANCELLED | OUTPATIENT
Start: 2017-06-14

## 2017-06-14 RX ORDER — VANCOMYCIN HYDROCHLORIDE 1 G/200ML
1000 INJECTION, SOLUTION INTRAVENOUS SEE ADMIN INSTRUCTIONS
Status: CANCELLED | OUTPATIENT
Start: 2017-06-14

## 2017-06-15 ENCOUNTER — HOSPITAL ENCOUNTER (INPATIENT)
Facility: CLINIC | Age: 67
LOS: 3 days | Discharge: NURSING, REHAB OR LONG-TERM CARE | DRG: 470 | End: 2017-06-18
Attending: ORTHOPAEDIC SURGERY | Admitting: ORTHOPAEDIC SURGERY
Payer: MEDICARE

## 2017-06-15 ENCOUNTER — ANESTHESIA (OUTPATIENT)
Dept: SURGERY | Facility: CLINIC | Age: 67
DRG: 470 | End: 2017-06-15
Payer: MEDICARE

## 2017-06-15 ENCOUNTER — APPOINTMENT (OUTPATIENT)
Dept: PHYSICAL THERAPY | Facility: CLINIC | Age: 67
DRG: 470 | End: 2017-06-15
Attending: ORTHOPAEDIC SURGERY
Payer: MEDICARE

## 2017-06-15 ENCOUNTER — APPOINTMENT (OUTPATIENT)
Dept: GENERAL RADIOLOGY | Facility: CLINIC | Age: 67
DRG: 470 | End: 2017-06-15
Attending: ORTHOPAEDIC SURGERY
Payer: MEDICARE

## 2017-06-15 ENCOUNTER — ANESTHESIA EVENT (OUTPATIENT)
Dept: SURGERY | Facility: CLINIC | Age: 67
DRG: 470 | End: 2017-06-15
Payer: MEDICARE

## 2017-06-15 DIAGNOSIS — Z96.652 STATUS POST TOTAL LEFT KNEE REPLACEMENT: Primary | ICD-10-CM

## 2017-06-15 LAB
ABO + RH BLD: NORMAL
ABO + RH BLD: NORMAL
BLD GP AB SCN SERPL QL: NORMAL
BLOOD BANK CMNT PATIENT-IMP: NORMAL
CREAT SERPL-MCNC: 0.56 MG/DL (ref 0.52–1.04)
GFR SERPL CREATININE-BSD FRML MDRD: NORMAL ML/MIN/1.7M2
HGB BLD-MCNC: 11.7 G/DL (ref 11.7–15.7)
PLATELET # BLD AUTO: 202 10E9/L (ref 150–450)
SPECIMEN EXP DATE BLD: NORMAL

## 2017-06-15 PROCEDURE — 37000008 ZZH ANESTHESIA TECHNICAL FEE, 1ST 30 MIN: Performed by: ORTHOPAEDIC SURGERY

## 2017-06-15 PROCEDURE — 25000128 H RX IP 250 OP 636: Performed by: ANESTHESIOLOGY

## 2017-06-15 PROCEDURE — 86850 RBC ANTIBODY SCREEN: CPT | Performed by: ORTHOPAEDIC SURGERY

## 2017-06-15 PROCEDURE — 97161 PT EVAL LOW COMPLEX 20 MIN: CPT | Mod: GP

## 2017-06-15 PROCEDURE — 25000128 H RX IP 250 OP 636: Performed by: ORTHOPAEDIC SURGERY

## 2017-06-15 PROCEDURE — 27210995 ZZH RX 272: Performed by: ORTHOPAEDIC SURGERY

## 2017-06-15 PROCEDURE — C1776 JOINT DEVICE (IMPLANTABLE): HCPCS | Performed by: ORTHOPAEDIC SURGERY

## 2017-06-15 PROCEDURE — 25000125 ZZHC RX 250: Performed by: ORTHOPAEDIC SURGERY

## 2017-06-15 PROCEDURE — 36000093 ZZH SURGERY LEVEL 4 1ST 30 MIN: Performed by: ORTHOPAEDIC SURGERY

## 2017-06-15 PROCEDURE — 40000171 ZZH STATISTIC PRE-PROCEDURE ASSESSMENT III: Performed by: ORTHOPAEDIC SURGERY

## 2017-06-15 PROCEDURE — 40000193 ZZH STATISTIC PT WARD VISIT

## 2017-06-15 PROCEDURE — 25000125 ZZHC RX 250: Performed by: ANESTHESIOLOGY

## 2017-06-15 PROCEDURE — A9270 NON-COVERED ITEM OR SERVICE: HCPCS | Mod: GY | Performed by: ORTHOPAEDIC SURGERY

## 2017-06-15 PROCEDURE — 36415 COLL VENOUS BLD VENIPUNCTURE: CPT | Performed by: ORTHOPAEDIC SURGERY

## 2017-06-15 PROCEDURE — 12000007 ZZH R&B INTERMEDIATE

## 2017-06-15 PROCEDURE — 25000125 ZZHC RX 250: Performed by: NURSE ANESTHETIST, CERTIFIED REGISTERED

## 2017-06-15 PROCEDURE — 86900 BLOOD TYPING SEROLOGIC ABO: CPT | Performed by: ORTHOPAEDIC SURGERY

## 2017-06-15 PROCEDURE — 25800025 ZZH RX 258: Performed by: ORTHOPAEDIC SURGERY

## 2017-06-15 PROCEDURE — 82565 ASSAY OF CREATININE: CPT | Performed by: ORTHOPAEDIC SURGERY

## 2017-06-15 PROCEDURE — 0SRD0J9 REPLACEMENT OF LEFT KNEE JOINT WITH SYNTHETIC SUBSTITUTE, CEMENTED, OPEN APPROACH: ICD-10-PCS | Performed by: ORTHOPAEDIC SURGERY

## 2017-06-15 PROCEDURE — 36415 COLL VENOUS BLD VENIPUNCTURE: CPT | Performed by: ANESTHESIOLOGY

## 2017-06-15 PROCEDURE — 25000128 H RX IP 250 OP 636: Performed by: NURSE ANESTHETIST, CERTIFIED REGISTERED

## 2017-06-15 PROCEDURE — 27110028 ZZH OR GENERAL SUPPLY NON-STERILE: Performed by: ORTHOPAEDIC SURGERY

## 2017-06-15 PROCEDURE — 40000986 XR KNEE PORT LT 1/2 VW: Mod: LT

## 2017-06-15 PROCEDURE — 85049 AUTOMATED PLATELET COUNT: CPT | Performed by: ORTHOPAEDIC SURGERY

## 2017-06-15 PROCEDURE — 97110 THERAPEUTIC EXERCISES: CPT | Mod: GP

## 2017-06-15 PROCEDURE — 27210794 ZZH OR GENERAL SUPPLY STERILE: Performed by: ORTHOPAEDIC SURGERY

## 2017-06-15 PROCEDURE — 37000009 ZZH ANESTHESIA TECHNICAL FEE, EACH ADDTL 15 MIN: Performed by: ORTHOPAEDIC SURGERY

## 2017-06-15 PROCEDURE — 25000566 ZZH SEVOFLURANE, EA 15 MIN: Performed by: ORTHOPAEDIC SURGERY

## 2017-06-15 PROCEDURE — 27810169 ZZH OR IMPLANT GENERAL: Performed by: ORTHOPAEDIC SURGERY

## 2017-06-15 PROCEDURE — S0020 INJECTION, BUPIVICAINE HYDRO: HCPCS | Performed by: ANESTHESIOLOGY

## 2017-06-15 PROCEDURE — 71000013 ZZH RECOVERY PHASE 1 LEVEL 1 EA ADDTL HR: Performed by: ORTHOPAEDIC SURGERY

## 2017-06-15 PROCEDURE — 25000132 ZZH RX MED GY IP 250 OP 250 PS 637: Mod: GY | Performed by: ORTHOPAEDIC SURGERY

## 2017-06-15 PROCEDURE — 36000063 ZZH SURGERY LEVEL 4 EA 15 ADDTL MIN: Performed by: ORTHOPAEDIC SURGERY

## 2017-06-15 PROCEDURE — 25000128 H RX IP 250 OP 636

## 2017-06-15 PROCEDURE — 85018 HEMOGLOBIN: CPT | Performed by: ANESTHESIOLOGY

## 2017-06-15 PROCEDURE — 71000012 ZZH RECOVERY PHASE 1 LEVEL 1 FIRST HR: Performed by: ORTHOPAEDIC SURGERY

## 2017-06-15 PROCEDURE — 86901 BLOOD TYPING SEROLOGIC RH(D): CPT | Performed by: ORTHOPAEDIC SURGERY

## 2017-06-15 DEVICE — IMP COMP FEMORAL VANGARD BIOM PS LT 65MM 183128: Type: IMPLANTABLE DEVICE | Site: KNEE | Status: FUNCTIONAL

## 2017-06-15 DEVICE — IMP COMP TIBIAL KNEE ASCENT 67MM 141222: Type: IMPLANTABLE DEVICE | Site: KNEE | Status: FUNCTIONAL

## 2017-06-15 DEVICE — IMP COMP PATELLA BIOM 3 PEG 34MM STD 184766: Type: IMPLANTABLE DEVICE | Site: KNEE | Status: FUNCTIONAL

## 2017-06-15 DEVICE — IMPLANTABLE DEVICE: Type: IMPLANTABLE DEVICE | Site: KNEE | Status: FUNCTIONAL

## 2017-06-15 DEVICE — BONE CEMENT RADIOPAQUE SIMPLEX HV FULL DOSE 6194-1-001: Type: IMPLANTABLE DEVICE | Site: KNEE | Status: FUNCTIONAL

## 2017-06-15 RX ORDER — FENTANYL CITRATE 50 UG/ML
25-50 INJECTION, SOLUTION INTRAMUSCULAR; INTRAVENOUS
Status: COMPLETED | OUTPATIENT
Start: 2017-06-15 | End: 2017-06-15

## 2017-06-15 RX ORDER — ONDANSETRON 4 MG/1
4 TABLET, ORALLY DISINTEGRATING ORAL EVERY 6 HOURS PRN
Status: DISCONTINUED | OUTPATIENT
Start: 2017-06-15 | End: 2017-06-18 | Stop reason: HOSPADM

## 2017-06-15 RX ORDER — EPHEDRINE SULFATE 50 MG/ML
INJECTION, SOLUTION INTRAMUSCULAR; INTRAVENOUS; SUBCUTANEOUS PRN
Status: DISCONTINUED | OUTPATIENT
Start: 2017-06-15 | End: 2017-06-15

## 2017-06-15 RX ORDER — ONDANSETRON 2 MG/ML
4 INJECTION INTRAMUSCULAR; INTRAVENOUS EVERY 6 HOURS PRN
Status: DISCONTINUED | OUTPATIENT
Start: 2017-06-15 | End: 2017-06-18 | Stop reason: HOSPADM

## 2017-06-15 RX ORDER — DIAZEPAM 5 MG
5-10 TABLET ORAL EVERY 6 HOURS PRN
Status: COMPLETED | OUTPATIENT
Start: 2017-06-15 | End: 2017-06-18

## 2017-06-15 RX ORDER — HYDROMORPHONE HYDROCHLORIDE 1 MG/ML
INJECTION, SOLUTION INTRAMUSCULAR; INTRAVENOUS; SUBCUTANEOUS
Status: COMPLETED
Start: 2017-06-15 | End: 2017-06-15

## 2017-06-15 RX ORDER — PROPOFOL 10 MG/ML
INJECTION, EMULSION INTRAVENOUS PRN
Status: DISCONTINUED | OUTPATIENT
Start: 2017-06-15 | End: 2017-06-15

## 2017-06-15 RX ORDER — CELECOXIB 200 MG/1
200 CAPSULE ORAL DAILY
Status: DISCONTINUED | OUTPATIENT
Start: 2017-06-18 | End: 2017-06-18 | Stop reason: HOSPADM

## 2017-06-15 RX ORDER — ACETAMINOPHEN 10 MG/ML
1000 INJECTION, SOLUTION INTRAVENOUS ONCE
Status: COMPLETED | OUTPATIENT
Start: 2017-06-15 | End: 2017-06-15

## 2017-06-15 RX ORDER — CEFAZOLIN SODIUM 1 G/3ML
1 INJECTION, POWDER, FOR SOLUTION INTRAMUSCULAR; INTRAVENOUS EVERY 8 HOURS
Status: COMPLETED | OUTPATIENT
Start: 2017-06-15 | End: 2017-06-16

## 2017-06-15 RX ORDER — CELECOXIB 200 MG/1
200 CAPSULE ORAL 2 TIMES DAILY WITH MEALS
Status: COMPLETED | OUTPATIENT
Start: 2017-06-15 | End: 2017-06-17

## 2017-06-15 RX ORDER — DEXAMETHASONE SODIUM PHOSPHATE 4 MG/ML
INJECTION, SOLUTION INTRA-ARTICULAR; INTRALESIONAL; INTRAMUSCULAR; INTRAVENOUS; SOFT TISSUE PRN
Status: DISCONTINUED | OUTPATIENT
Start: 2017-06-15 | End: 2017-06-15

## 2017-06-15 RX ORDER — LEVOTHYROXINE SODIUM 112 UG/1
112 TABLET ORAL AT BEDTIME
Status: DISCONTINUED | OUTPATIENT
Start: 2017-06-15 | End: 2017-06-18 | Stop reason: HOSPADM

## 2017-06-15 RX ORDER — FENTANYL CITRATE 50 UG/ML
25-50 INJECTION, SOLUTION INTRAMUSCULAR; INTRAVENOUS
Status: DISCONTINUED | OUTPATIENT
Start: 2017-06-15 | End: 2017-06-15 | Stop reason: HOSPADM

## 2017-06-15 RX ORDER — LABETALOL HYDROCHLORIDE 5 MG/ML
INJECTION, SOLUTION INTRAVENOUS PRN
Status: DISCONTINUED | OUTPATIENT
Start: 2017-06-15 | End: 2017-06-15

## 2017-06-15 RX ORDER — CEFAZOLIN SODIUM 1 G/3ML
1 INJECTION, POWDER, FOR SOLUTION INTRAMUSCULAR; INTRAVENOUS SEE ADMIN INSTRUCTIONS
Status: DISCONTINUED | OUTPATIENT
Start: 2017-06-15 | End: 2017-06-15 | Stop reason: HOSPADM

## 2017-06-15 RX ORDER — PROCHLORPERAZINE MALEATE 5 MG
5 TABLET ORAL EVERY 6 HOURS PRN
Status: DISCONTINUED | OUTPATIENT
Start: 2017-06-15 | End: 2017-06-18 | Stop reason: HOSPADM

## 2017-06-15 RX ORDER — CEFAZOLIN SODIUM 2 G/100ML
2 INJECTION, SOLUTION INTRAVENOUS
Status: COMPLETED | OUTPATIENT
Start: 2017-06-15 | End: 2017-06-15

## 2017-06-15 RX ORDER — SODIUM CHLORIDE, SODIUM LACTATE, POTASSIUM CHLORIDE, CALCIUM CHLORIDE 600; 310; 30; 20 MG/100ML; MG/100ML; MG/100ML; MG/100ML
INJECTION, SOLUTION INTRAVENOUS CONTINUOUS
Status: DISCONTINUED | OUTPATIENT
Start: 2017-06-15 | End: 2017-06-15 | Stop reason: HOSPADM

## 2017-06-15 RX ORDER — HYDROMORPHONE HYDROCHLORIDE 1 MG/ML
.3-.5 INJECTION, SOLUTION INTRAMUSCULAR; INTRAVENOUS; SUBCUTANEOUS EVERY 30 MIN PRN
Status: DISCONTINUED | OUTPATIENT
Start: 2017-06-15 | End: 2017-06-18 | Stop reason: HOSPADM

## 2017-06-15 RX ORDER — ONDANSETRON 2 MG/ML
4 INJECTION INTRAMUSCULAR; INTRAVENOUS EVERY 30 MIN PRN
Status: DISCONTINUED | OUTPATIENT
Start: 2017-06-15 | End: 2017-06-15 | Stop reason: HOSPADM

## 2017-06-15 RX ORDER — DEXTROSE MONOHYDRATE, SODIUM CHLORIDE, AND POTASSIUM CHLORIDE 50; 1.49; 4.5 G/1000ML; G/1000ML; G/1000ML
INJECTION, SOLUTION INTRAVENOUS CONTINUOUS
Status: DISCONTINUED | OUTPATIENT
Start: 2017-06-15 | End: 2017-06-18 | Stop reason: HOSPADM

## 2017-06-15 RX ORDER — HYDROMORPHONE HYDROCHLORIDE 1 MG/ML
.3-.5 INJECTION, SOLUTION INTRAMUSCULAR; INTRAVENOUS; SUBCUTANEOUS EVERY 5 MIN PRN
Status: DISCONTINUED | OUTPATIENT
Start: 2017-06-15 | End: 2017-06-15 | Stop reason: HOSPADM

## 2017-06-15 RX ORDER — OXYCODONE HCL 10 MG/1
10 TABLET, FILM COATED, EXTENDED RELEASE ORAL EVERY 12 HOURS
Status: COMPLETED | OUTPATIENT
Start: 2017-06-15 | End: 2017-06-18

## 2017-06-15 RX ORDER — LAMOTRIGINE 100 MG/1
100 TABLET ORAL 2 TIMES DAILY
Status: DISCONTINUED | OUTPATIENT
Start: 2017-06-15 | End: 2017-06-18 | Stop reason: HOSPADM

## 2017-06-15 RX ORDER — ACETAMINOPHEN 325 MG/1
650 TABLET ORAL EVERY 4 HOURS PRN
Status: DISCONTINUED | OUTPATIENT
Start: 2017-06-18 | End: 2017-06-18 | Stop reason: HOSPADM

## 2017-06-15 RX ORDER — AMOXICILLIN 250 MG
1-2 CAPSULE ORAL 2 TIMES DAILY
Status: DISCONTINUED | OUTPATIENT
Start: 2017-06-15 | End: 2017-06-18 | Stop reason: HOSPADM

## 2017-06-15 RX ORDER — LIDOCAINE 40 MG/G
CREAM TOPICAL
Status: DISCONTINUED | OUTPATIENT
Start: 2017-06-15 | End: 2017-06-18 | Stop reason: HOSPADM

## 2017-06-15 RX ORDER — ONDANSETRON 2 MG/ML
INJECTION INTRAMUSCULAR; INTRAVENOUS PRN
Status: DISCONTINUED | OUTPATIENT
Start: 2017-06-15 | End: 2017-06-15

## 2017-06-15 RX ORDER — FENTANYL CITRATE 50 UG/ML
INJECTION, SOLUTION INTRAMUSCULAR; INTRAVENOUS PRN
Status: DISCONTINUED | OUTPATIENT
Start: 2017-06-15 | End: 2017-06-15

## 2017-06-15 RX ORDER — ALUMINA, MAGNESIA, AND SIMETHICONE 2400; 2400; 240 MG/30ML; MG/30ML; MG/30ML
15-30 SUSPENSION ORAL EVERY 4 HOURS PRN
Status: DISCONTINUED | OUTPATIENT
Start: 2017-06-15 | End: 2017-06-18 | Stop reason: HOSPADM

## 2017-06-15 RX ORDER — ACETAMINOPHEN 325 MG/1
975 TABLET ORAL EVERY 8 HOURS
Status: COMPLETED | OUTPATIENT
Start: 2017-06-15 | End: 2017-06-18

## 2017-06-15 RX ORDER — DIPHENHYDRAMINE HCL 12.5MG/5ML
12.5 LIQUID (ML) ORAL EVERY 6 HOURS PRN
Status: DISCONTINUED | OUTPATIENT
Start: 2017-06-15 | End: 2017-06-18 | Stop reason: HOSPADM

## 2017-06-15 RX ORDER — TRAZODONE HYDROCHLORIDE 100 MG/1
300 TABLET ORAL AT BEDTIME
Status: DISCONTINUED | OUTPATIENT
Start: 2017-06-15 | End: 2017-06-18 | Stop reason: HOSPADM

## 2017-06-15 RX ORDER — MAGNESIUM HYDROXIDE 1200 MG/15ML
LIQUID ORAL PRN
Status: DISCONTINUED | OUTPATIENT
Start: 2017-06-15 | End: 2017-06-15 | Stop reason: HOSPADM

## 2017-06-15 RX ORDER — DIPHENHYDRAMINE HYDROCHLORIDE 50 MG/ML
12.5 INJECTION INTRAMUSCULAR; INTRAVENOUS EVERY 6 HOURS PRN
Status: DISCONTINUED | OUTPATIENT
Start: 2017-06-15 | End: 2017-06-18 | Stop reason: HOSPADM

## 2017-06-15 RX ORDER — GABAPENTIN 600 MG/1
600 TABLET ORAL 3 TIMES DAILY
Status: DISCONTINUED | OUTPATIENT
Start: 2017-06-15 | End: 2017-06-18 | Stop reason: HOSPADM

## 2017-06-15 RX ORDER — OXYCODONE HYDROCHLORIDE 5 MG/1
5-10 TABLET ORAL
Status: DISCONTINUED | OUTPATIENT
Start: 2017-06-15 | End: 2017-06-18 | Stop reason: HOSPADM

## 2017-06-15 RX ORDER — LIDOCAINE HYDROCHLORIDE 20 MG/ML
INJECTION, SOLUTION INFILTRATION; PERINEURAL PRN
Status: DISCONTINUED | OUTPATIENT
Start: 2017-06-15 | End: 2017-06-15

## 2017-06-15 RX ORDER — ONDANSETRON 4 MG/1
4 TABLET, ORALLY DISINTEGRATING ORAL EVERY 30 MIN PRN
Status: DISCONTINUED | OUTPATIENT
Start: 2017-06-15 | End: 2017-06-15 | Stop reason: HOSPADM

## 2017-06-15 RX ORDER — VALACYCLOVIR HYDROCHLORIDE 1 G/1
1000 TABLET, FILM COATED ORAL DAILY
Status: DISCONTINUED | OUTPATIENT
Start: 2017-06-15 | End: 2017-06-18 | Stop reason: HOSPADM

## 2017-06-15 RX ORDER — NALOXONE HYDROCHLORIDE 0.4 MG/ML
.1-.4 INJECTION, SOLUTION INTRAMUSCULAR; INTRAVENOUS; SUBCUTANEOUS
Status: DISCONTINUED | OUTPATIENT
Start: 2017-06-15 | End: 2017-06-18 | Stop reason: HOSPADM

## 2017-06-15 RX ADMIN — HYDROMORPHONE HYDROCHLORIDE 0.5 MG: 1 INJECTION, SOLUTION INTRAMUSCULAR; INTRAVENOUS; SUBCUTANEOUS at 12:26

## 2017-06-15 RX ADMIN — MIDAZOLAM HYDROCHLORIDE 2 MG: 1 INJECTION, SOLUTION INTRAMUSCULAR; INTRAVENOUS at 07:23

## 2017-06-15 RX ADMIN — ACETAMINOPHEN 975 MG: 325 TABLET, FILM COATED ORAL at 15:46

## 2017-06-15 RX ADMIN — LABETALOL HYDROCHLORIDE 5 MG: 5 INJECTION, SOLUTION INTRAVENOUS at 09:17

## 2017-06-15 RX ADMIN — HYDROMORPHONE HYDROCHLORIDE 0.5 MG: 1 INJECTION, SOLUTION INTRAMUSCULAR; INTRAVENOUS; SUBCUTANEOUS at 10:30

## 2017-06-15 RX ADMIN — HYDROMORPHONE HYDROCHLORIDE 0.5 MG: 1 INJECTION, SOLUTION INTRAMUSCULAR; INTRAVENOUS; SUBCUTANEOUS at 23:54

## 2017-06-15 RX ADMIN — EPINEPHRINE 30 ML GIVEN: 1 INJECTION, SOLUTION INTRAMUSCULAR; SUBCUTANEOUS at 06:55

## 2017-06-15 RX ADMIN — LAMOTRIGINE 100 MG: 100 TABLET ORAL at 21:29

## 2017-06-15 RX ADMIN — FENTANYL CITRATE 50 MCG: 50 INJECTION, SOLUTION INTRAMUSCULAR; INTRAVENOUS at 09:49

## 2017-06-15 RX ADMIN — FENTANYL CITRATE 100 MCG: 50 INJECTION, SOLUTION INTRAMUSCULAR; INTRAVENOUS at 06:49

## 2017-06-15 RX ADMIN — DEXAMETHASONE SODIUM PHOSPHATE 4 MG: 4 INJECTION, SOLUTION INTRA-ARTICULAR; INTRALESIONAL; INTRAMUSCULAR; INTRAVENOUS; SOFT TISSUE at 07:33

## 2017-06-15 RX ADMIN — ACETAMINOPHEN 1000 MG: 10 INJECTION, SOLUTION INTRAVENOUS at 09:49

## 2017-06-15 RX ADMIN — FENTANYL CITRATE 50 MCG: 50 INJECTION, SOLUTION INTRAMUSCULAR; INTRAVENOUS at 09:38

## 2017-06-15 RX ADMIN — LIDOCAINE HYDROCHLORIDE 1 ML: 10 INJECTION, SOLUTION EPIDURAL; INFILTRATION; INTRACAUDAL; PERINEURAL at 06:33

## 2017-06-15 RX ADMIN — HYDROMORPHONE HYDROCHLORIDE 0.5 MG: 1 INJECTION, SOLUTION INTRAMUSCULAR; INTRAVENOUS; SUBCUTANEOUS at 09:46

## 2017-06-15 RX ADMIN — POTASSIUM CHLORIDE, DEXTROSE MONOHYDRATE AND SODIUM CHLORIDE: 150; 5; 450 INJECTION, SOLUTION INTRAVENOUS at 13:50

## 2017-06-15 RX ADMIN — Medication 5 MG: at 07:50

## 2017-06-15 RX ADMIN — LAMOTRIGINE 100 MG: 100 TABLET ORAL at 13:49

## 2017-06-15 RX ADMIN — HYDROMORPHONE HYDROCHLORIDE 0.5 MG: 1 INJECTION, SOLUTION INTRAMUSCULAR; INTRAVENOUS; SUBCUTANEOUS at 15:55

## 2017-06-15 RX ADMIN — HYDROMORPHONE HYDROCHLORIDE 0.5 MG: 1 INJECTION, SOLUTION INTRAMUSCULAR; INTRAVENOUS; SUBCUTANEOUS at 19:32

## 2017-06-15 RX ADMIN — FENTANYL CITRATE 50 MCG: 50 INJECTION, SOLUTION INTRAMUSCULAR; INTRAVENOUS at 07:25

## 2017-06-15 RX ADMIN — SENNOSIDES AND DOCUSATE SODIUM 1 TABLET: 8.6; 5 TABLET ORAL at 21:30

## 2017-06-15 RX ADMIN — OXYCODONE HYDROCHLORIDE 10 MG: 10 TABLET, FILM COATED, EXTENDED RELEASE ORAL at 19:20

## 2017-06-15 RX ADMIN — FENTANYL CITRATE 50 MCG: 50 INJECTION, SOLUTION INTRAMUSCULAR; INTRAVENOUS at 08:00

## 2017-06-15 RX ADMIN — FENTANYL CITRATE 50 MCG: 50 INJECTION, SOLUTION INTRAMUSCULAR; INTRAVENOUS at 09:31

## 2017-06-15 RX ADMIN — CEFAZOLIN SODIUM 1 G: 1 INJECTION, POWDER, FOR SOLUTION INTRAMUSCULAR; INTRAVENOUS at 15:46

## 2017-06-15 RX ADMIN — SERTRALINE HYDROCHLORIDE 150 MG: 100 TABLET ORAL at 13:49

## 2017-06-15 RX ADMIN — LABETALOL HYDROCHLORIDE 10 MG: 5 INJECTION, SOLUTION INTRAVENOUS at 09:25

## 2017-06-15 RX ADMIN — HYDROMORPHONE HYDROCHLORIDE 0.25 MG: 1 INJECTION, SOLUTION INTRAMUSCULAR; INTRAVENOUS; SUBCUTANEOUS at 08:57

## 2017-06-15 RX ADMIN — GABAPENTIN 600 MG: 600 TABLET, FILM COATED ORAL at 15:46

## 2017-06-15 RX ADMIN — HYDROMORPHONE HYDROCHLORIDE 0.5 MG: 1 INJECTION, SOLUTION INTRAMUSCULAR; INTRAVENOUS; SUBCUTANEOUS at 10:48

## 2017-06-15 RX ADMIN — CEFAZOLIN SODIUM 1 G: 1 INJECTION, POWDER, FOR SOLUTION INTRAMUSCULAR; INTRAVENOUS at 23:55

## 2017-06-15 RX ADMIN — HYDROMORPHONE HYDROCHLORIDE 0.5 MG: 1 INJECTION, SOLUTION INTRAMUSCULAR; INTRAVENOUS; SUBCUTANEOUS at 10:18

## 2017-06-15 RX ADMIN — ACETAMINOPHEN 975 MG: 325 TABLET, FILM COATED ORAL at 23:55

## 2017-06-15 RX ADMIN — SODIUM CHLORIDE, POTASSIUM CHLORIDE, SODIUM LACTATE AND CALCIUM CHLORIDE: 600; 310; 30; 20 INJECTION, SOLUTION INTRAVENOUS at 11:42

## 2017-06-15 RX ADMIN — CEFAZOLIN SODIUM 2 G: 2 INJECTION, SOLUTION INTRAVENOUS at 07:30

## 2017-06-15 RX ADMIN — OXYCODONE HYDROCHLORIDE 5 MG: 5 TABLET ORAL at 21:30

## 2017-06-15 RX ADMIN — FENTANYL CITRATE 50 MCG: 50 INJECTION, SOLUTION INTRAMUSCULAR; INTRAVENOUS at 11:42

## 2017-06-15 RX ADMIN — TRANEXAMIC ACID 1 G: 100 INJECTION, SOLUTION INTRAVENOUS at 09:28

## 2017-06-15 RX ADMIN — LIDOCAINE HYDROCHLORIDE 80 MG: 20 INJECTION, SOLUTION INFILTRATION; PERINEURAL at 07:25

## 2017-06-15 RX ADMIN — HYDROMORPHONE HYDROCHLORIDE 0.5 MG: 1 INJECTION, SOLUTION INTRAMUSCULAR; INTRAVENOUS; SUBCUTANEOUS at 08:03

## 2017-06-15 RX ADMIN — LEVOTHYROXINE SODIUM 112 MCG: 112 TABLET ORAL at 21:30

## 2017-06-15 RX ADMIN — PROPOFOL 150 MG: 10 INJECTION, EMULSION INTRAVENOUS at 07:25

## 2017-06-15 RX ADMIN — VALACYCLOVIR HYDROCHLORIDE 1000 MG: 1 TABLET, FILM COATED ORAL at 15:45

## 2017-06-15 RX ADMIN — SODIUM CHLORIDE, POTASSIUM CHLORIDE, SODIUM LACTATE AND CALCIUM CHLORIDE: 600; 310; 30; 20 INJECTION, SOLUTION INTRAVENOUS at 09:09

## 2017-06-15 RX ADMIN — SODIUM CHLORIDE, POTASSIUM CHLORIDE, SODIUM LACTATE AND CALCIUM CHLORIDE: 600; 310; 30; 20 INJECTION, SOLUTION INTRAVENOUS at 06:33

## 2017-06-15 RX ADMIN — LABETALOL HYDROCHLORIDE 5 MG: 5 INJECTION, SOLUTION INTRAVENOUS at 09:15

## 2017-06-15 RX ADMIN — FENTANYL CITRATE 50 MCG: 50 INJECTION, SOLUTION INTRAMUSCULAR; INTRAVENOUS at 09:55

## 2017-06-15 RX ADMIN — OXYCODONE HYDROCHLORIDE 5 MG: 5 TABLET ORAL at 21:56

## 2017-06-15 RX ADMIN — ONDANSETRON 4 MG: 2 INJECTION INTRAMUSCULAR; INTRAVENOUS at 09:14

## 2017-06-15 RX ADMIN — GABAPENTIN 600 MG: 600 TABLET, FILM COATED ORAL at 21:30

## 2017-06-15 RX ADMIN — HYDROMORPHONE HYDROCHLORIDE 0.25 MG: 1 INJECTION, SOLUTION INTRAMUSCULAR; INTRAVENOUS; SUBCUTANEOUS at 08:28

## 2017-06-15 RX ADMIN — CELECOXIB 200 MG: 200 CAPSULE ORAL at 19:20

## 2017-06-15 RX ADMIN — MIDAZOLAM HYDROCHLORIDE 2 MG: 1 INJECTION, SOLUTION INTRAMUSCULAR; INTRAVENOUS at 10:01

## 2017-06-15 RX ADMIN — TRANEXAMIC ACID 1 G: 100 INJECTION, SOLUTION INTRAVENOUS at 07:40

## 2017-06-15 ASSESSMENT — LIFESTYLE VARIABLES: TOBACCO_USE: 0

## 2017-06-15 ASSESSMENT — ENCOUNTER SYMPTOMS: SEIZURES: 0

## 2017-06-15 NOTE — OR NURSING
Ok to give IV tylenol and versed for sedation, patient experiencing a number 10 rating. Will continue to monitor

## 2017-06-15 NOTE — ANESTHESIA PREPROCEDURE EVALUATION
Procedure: Procedure(s):  ARTHROPLASTY KNEE  Preop diagnosis: LEFT KNEE DJD    Allergies   Allergen Reactions     Cold Medicine [Gnp Flu Cold-Cough] Anaphylaxis     Bees Hives and Swelling     Nuts Hives and Swelling     Past Medical History:   Diagnosis Date     Anxiety      Bleeding disorder (H)     anemia-4 units of blood     Bleeding disorder (H)      Cerebral aneurysm      Compression fracture of L2 lumbar vertebra (H)      Compression fracture of L2 lumbar vertebra (H)      Depression      Depression      Diverticulosis      Diverticulosis      Eczema      Eczema      Heartburn      Hypothyroidism      Hypothyroidism      Neck pain      Nerve pain      Osteoporosis      Osteoporosis      Pernicious anemia      Pernicious anemia      Seasonal allergies      Past Surgical History:   Procedure Laterality Date     ABDOMINOPLASTY       AS REPAIR OF HAMMERTOE,ONE       COLONOSCOPY       ENT SURGERY      septoplasty,palate/uvula surgery     EYE SURGERY Bilateral     cataract     GASTRIC BYPASS       GYN SURGERY      pelvic laparoscopy,tubal ligation     IMPLANT STIMULATOR SACRAL NERVE STAGE ONE  6/12/2014    Procedure: IMPLANT STIMULATOR SACRAL NERVE STAGE ONE;  Surgeon: Manan Jacques MD;  Location:  OR     IMPLANT STIMULATOR SACRAL NERVE STAGE TWO  7/3/2014    Procedure: IMPLANT STIMULATOR SACRAL NERVE STAGE TWO;  Surgeon: Manan Jacques MD;  Location:  OR     LAPAROSCOPY,VAG HYSTERECTOMY      BSO     ORTHOPEDIC SURGERY      carpal tunnel,bunion,hammertoe,     Prior to Admission medications    Medication Sig Start Date End Date Taking? Authorizing Provider   Misc Natural Products (GLUCOSAMINE CHOND COMPLEX/MSM PO) Take 2 tablets by mouth At Bedtime   Yes Reported, Patient   ValACYclovir HCl (VALTREX PO) Take 1,000 mg by mouth daily    Yes Reported, Patient   ACETAMINOPHEN PO Take 500 mg by mouth every 8 hours as needed for pain 2 tabs 3 times daily   Yes Reported, Patient   TRAZODONE HCL PO Take  300 mg by mouth At Bedtime (3 x 100mg = 300mg)   Yes Reported, Patient   Levothyroxine Sodium (LEVOTHROID PO) Take 112 mcg by mouth At Bedtime   Yes Reported, Patient   CYANOCOBALAMIN PO Take 1,000 mcg by mouth At Bedtime   Yes Reported, Patient   SERTRALINE HCL PO Take 150 mg by mouth daily (Takes 1.5 x 100mg tablet = 150mg dose)   Yes Reported, Patient   VITAMIN D, CHOLECALCIFEROL, PO Take 2,000 Units by mouth daily   Yes Reported, Patient   Gabapentin (NEURONTIN PO) Take 600 mg by mouth 3 times daily (Takes 2 x 300mg capsule= 600mg dose)   Yes Reported, Patient   ascorbic acid 1000 MG TABS tablet Take 1,000 mg by mouth daily TAke 2 1000mg tabs three times daily.   Yes Reported, Patient   LamoTRIgine (LAMICTAL PO) Take 100 mg by mouth 2 times daily   Yes Reported, Patient   EPINEPHrine (EPIPEN) 0.3 MG/0.3ML injection Inject 0.3 mg into the muscle once as needed for anaphylaxis   Yes Reported, Patient   multivitamin, therapeutic with minerals (MULTI-VITAMIN) TABS Take 1 tablet by mouth daily   Yes Reported, Patient     Current Facility-Administered Medications Ordered in Epic   Medication Dose Route Frequency Last Rate Last Dose     ceFAZolin sodium-dextrose (ANCEF) infusion 2 g  2 g Intravenous Pre-Op/Pre-procedure x 1 dose         ceFAZolin (ANCEF) 1 g vial to attach to  ml bag for ADULT or 50 ml bag for PEDS  1 g Intravenous See Admin Instructions         tranexamic acid (CYKLOKAPRON) 1 g in NaCl 0.9 % 60 mL bolus  1 g Intravenous Once         tranexamic acid (CYKLOKAPRON) 1 g in NaCl 0.9 % 60 mL bolus  1 g Intravenous Once         lidocaine 1 % 1 mL  1 mL Other Q1H PRN   1 mL at 06/15/17 0633     sodium chloride (PF) 0.9% PF flush 3 mL  3 mL Intracatheter Q1H PRN         lactated ringers infusion   Intravenous Continuous 25 mL/hr at 06/15/17 0633       No current [facility]-ordered outpatient prescriptions on file.     Wt Readings from Last 1 Encounters:   06/15/17 70.3 kg (155 lb 1 oz)     Temp Readings from  Last 1 Encounters:   06/15/17 36.2  C (97.2  F) (Temporal)     BP Readings from Last 6 Encounters:   06/15/17 158/81   06/04/17 117/76   06/03/17 122/76   04/22/17 124/55   04/16/17 117/65   04/15/17 134/67     Pulse Readings from Last 4 Encounters:   06/15/17 64   06/04/17 79   06/03/17 82   04/22/17 97     Resp Readings from Last 1 Encounters:   06/15/17 16     SpO2 Readings from Last 1 Encounters:   06/15/17 98%     Recent Labs   Lab Test  04/10/17   1506   CR  0.72     Recent Labs   Lab Test  04/10/17   1506  04/10/17   1459   WBC  5.5  Canceled, Test credited   --    HGB  8.2*  Canceled, Test credited  CORRECTED ON 04/10 AT 1700: PREVIOUSLY REPORTED AS Canceled, Test credited   Duplicate request SEE CBC RESULTS KS    Canceled, Test credited   Duplicate request  SEE CBC RESULTS KS  CORRECTED ON 04/10 AT 1618: PREVIOUSLY REPORTED AS 8.2     PLT  368  Canceled, Test credited   --        Hgb 11.7 on 5/22/17    Anesthesia Evaluation     . Pt has had prior anesthetic.     No history of anesthetic complications          ROS/MED HX    ENT/Pulmonary:      (-) tobacco use and asthma   Neurologic: Comment: Cerebral aneurysm      (-) seizures and CVA   Cardiovascular:        (-) hypertension and CAD   METS/Exercise Tolerance:     Hematologic: Comments: Hx of prolonged bleeding    (+) Anemia, History of Transfusion -      Musculoskeletal:   (+) arthritis, , , -       GI/Hepatic:        (-) GERD and liver disease   Renal/Genitourinary:      (-) renal disease   Endo:     (+) thyroid problem hypothyroidism, .   (-) Type II DM   Psychiatric:     (+) psychiatric history anxiety and depression      Infectious Disease:        (-) Recent Fever   Malignancy:         Other:                     Physical Exam  Normal systems: cardiovascular and pulmonary    Airway   Mallampati: II  Neck ROM: full    Dental   (+) caps    Cardiovascular       Pulmonary                     Anesthesia Plan      History & Physical Review  History and  physical reviewed and following examination; no interval change.    ASA Status:  2 .    NPO Status:  > 8 hours    Plan for General, LMA and Periph. Nerve Block for postop pain with Propofol induction. Maintenance will be Balanced.    PONV prophylaxis:  Ondansetron (or other 5HT-3) and Dexamethasone or Solumedrol       Postoperative Care  Postoperative pain management:  Multi-modal analgesia.      Consents  Anesthetic plan, risks, benefits and alternatives discussed with:  Patient..                          .

## 2017-06-15 NOTE — PLAN OF CARE
Problem: Goal Outcome Summary  Goal: Goal Outcome Summary  PT: Orders received, evaluation completed and treatment initiated. 66 year old female s/p L TKA on 6/15/17. PMH significant for anxiety. Patient reports independence with mobility/ADL's prior to admission. Patient lives alone in a house with 1 stair (no rail) to enter and 12 stairs (one rail) to the upstairs. Patient reports she does not have friends or family that can assist on discharge. Patient owns a straight cane.      Discharge Planner PT   Patient plan for discharge: Rehab  Current status: Functional mobility not appropriate to be assessed, medication limiting patient's level of alertness and fatigue. Tolerates supine L LE AAROM exercises.   Barriers to return to prior living situation: To be updated with mobility assessment.   Recommendations for discharge: TBD on POD#2  Rationale for recommendations: TBD on POD #2       Entered by: Aislinn Avilez 06/15/2017 3:28 PM

## 2017-06-15 NOTE — ANESTHESIA PROCEDURE NOTES
Peripheral nerve/Neuraxial procedure note : Adductor canal and Femoral  Pre-Procedure  Performed by JULIAN HURST  Location: pre-op      Pre-Anesthestic Checklist: patient identified, IV checked, site marked, risks and benefits discussed, informed consent, monitors and equipment checked, pre-op evaluation, at physician/surgeon's request and post-op pain management    Timeout  Correct Patient: Yes   Correct Procedure: Yes   Correct Site: Yes   Correct Laterality: Yes   Correct Position: Yes   Site Marked: Yes   .   Procedure Documentation    .    Procedure:    Adductor canal and Femoral.  Local skin infiltrated with 3 mL of 1% lidocaine.     Ultrasound used to identify targeted nerve, plexus, or vascular marker and placed a needle adjacent to it., Ultrasound was used to visualize the spread of the anesthetic in close proximity to the above stated nerve. A permanent image is entered into the patient's record.  Patient Prep;mask, sterile gloves, chlorhexidine gluconate and isopropyl alcohol, patient draped.  .  Needle: other (22 G. 100 mm ). .  Spinal Needle: . . Insertion Method: Single Shot.     Assessment/Narrative  Paresthesias: No.  .  The placement was negative for: blood aspirated, painful injection and site bleeding.  Bolus given via needle. No blood aspirated via catheter.   Secured via.   Complications: none. Comments:  Bupivacaine 0.5% with 1:400,000 epi 30ml   Patient sedated but commutative throughout the procedure.   Patient tolerated well.    The surgeon has given a verbal order transferring this pt to me for a performance of regional analgesia block for post op pain control. It is requested of me because I am trained and qualifed to perform this block and the surgeon is nether trained nor qualified to perform this procedure.

## 2017-06-15 NOTE — BRIEF OP NOTE
[facility] Brief Operative Note    Pre-operative diagnosis: LEFT KNEE DJD   Post-operative diagnosis * No post-op diagnosis entered *  same   Procedure: Procedure(s):  LEFT TOTAL KNEE ARTHROPLASTY (BIOMET)^ - Wound Class: I-Clean   Surgeon(s): Surgeon(s) and Role:     * Balta Hoffman MD - Primary     * Manda Acosta PA-C - Assisting   Estimated blood loss: * No values recorded between 6/15/2017  7:59 AM and 6/15/2017  9:19 AM *    Specimens: * No specimens in log *   Findings: Lateral oa

## 2017-06-15 NOTE — IP AVS SNAPSHOT
[address] [facility]    [address] [place] [address]    Phone:  [phone]                                       After Visit Summary   6/15/2017    Amalia Britton    MRN: [id number]           After Visit Summary Signature Page     I have received my discharge instructions, and my questions have been answered. I have discussed any challenges I see with this plan with the nurse or doctor.    ..........................................................................................................................................  Patient/Patient Representative Signature      ..........................................................................................................................................  Patient Representative Print Name and Relationship to Patient    ..................................................               ................................................  Date                                            Time    ..........................................................................................................................................  Reviewed by Signature/Title    ...................................................              ..............................................  Date                                                            Time

## 2017-06-15 NOTE — ANESTHESIA CARE TRANSFER NOTE
Patient: Amalia THOMAS Rotar    Procedure(s):  LEFT TOTAL KNEE ARTHROPLASTY (BIOMET)^ - Wound Class: I-Clean    Diagnosis: LEFT KNEE DJD  Diagnosis Additional Information: No value filed.    Anesthesia Type:   General, LMA, Periph. Nerve Block for postop pain     Note:  Airway :Face Mask  Patient transferred to:PACU  Comments: Spontaneous respirations, airway patent, LMA removed atraumatically. Oxygen via face mask at 8 LPM to PACU, connected to wall O2 in PACU. All monitors and alarms on and functioning. Report given to PACU RN and questions answered.       Vitals: (Last set prior to Anesthesia Care Transfer)    CRNA VITALS  6/15/2017 0902 - 6/15/2017 0941      6/15/2017             Resp Rate (set): 10                Electronically Signed By: MICHAELLE Dorado CRNA  Tamiko 15, 2017  9:41 AM

## 2017-06-15 NOTE — IP AVS SNAPSHOT
[place] [address] [facility]: [phone]            Medication Administration Report for Amalia Britton as of 06/18/17 1217   Legend:    Given Hold Not Given Due Canceled Entry Other Actions    Time Time (Time) Time  Time-Action       Inactive    Active    Linked        Medications 06/12/17 06/13/17 06/14/17 06/15/17 06/16/17 06/17/17 06/18/17    acetaminophen (TYLENOL) tablet 650 mg  Dose: 650 mg Freq: EVERY 4 HOURS PRN Route: PO  PRN Reason: other  PRN Comment: surgical pain  Start: 06/18/17 0000   Admin Instructions: May give first dose 4 hours after last scheduled dose of acetaminophen.  Maximum acetaminophen dose from all sources = 75 mg/kg/day not to exceed 4 grams/day.               alum & mag hydroxide-simethicone (MYLANTA ES/MAALOX  ES) suspension 15-30 mL  Dose: 15-30 mL Freq: EVERY 4 HOURS PRN Route: PO  PRN Reason: indigestion  Start: 06/15/17 1213   Admin Instructions: Shake well.               benzocaine-menthol (CHLORASEPTIC) 6-10 MG lozenge 1-2 lozenge  Dose: 1-2 lozenge Freq: EVERY 1 HOUR PRN Route: BU  PRN Reason: sore throat  PRN Comment: sore throat without fever  Start: 06/15/17 1213              celecoxib (celeBREX) capsule 200 mg  Dose: 200 mg Freq: DAILY Route: PO  Start: 06/18/17 0900          0824 (200 mg)-Given           dextrose 5% and 0.45% NaCl + KCl 20 mEq/L infusion  Rate: 75 mL/hr Freq: CONTINUOUS Route: IV  Start: 06/15/17 1215   Admin Instructions: Change to saline lock when PO well tolerated.        1350 ( )-New Bag              diphenhydrAMINE (BENADRYL) solution 12.5 mg  Dose: 12.5 mg Freq: EVERY 6 HOURS PRN Route: PO  PRN Reason: itching  Start: 06/15/17 1213   Admin Instructions: Caution to be used when administering multiple CNS depressing meds within a short time frame.              Or  diphenhydrAMINE (BENADRYL) injection 12.5 mg  Dose: 12.5 mg Freq: EVERY 6 HOURS PRN Route: IV  PRN Reason: itching  PRN Comment: Only give if patient unable to take  PO.  Start: 06/15/17 1213   Admin Instructions: Caution to be used when administering multiple CNS depressing meds within a short time frame.               enoxaparin (LOVENOX) injection 40 mg  Dose: 40 mg Freq: EVERY 24 HOURS Route: SC  Start: 06/16/17 0800   Admin Instructions: Check to make sure start date/time is 12-24 hours post op unless documented complication, AND no sooner than 22 hours post op if spinal anesthesia used.   Continue until discharge to home. HOLD if platelet count falls below 50% of baseline or less than 100,000/ L and notify provider.         0752 (40 mg)-Given        0801 (40 mg)-Given        0823 (40 mg)-Given           EPINEPHrine (ADRENALIN) injection 0.3 mg  Dose: 0.3 mg Freq: ONCE PRN Route: IM  PRN Reason: anaphylaxis  Start: 06/15/17 1213   Admin Instructions: Not for direct undiluted intravenous injection. (1 mg/mL = 1:1,000 concentration). Protect from light.               gabapentin (NEURONTIN) tablet 600 mg  Dose: 600 mg Freq: 3 TIMES DAILY Route: PO  Start: 06/15/17 1600       1546 (600 mg)-Given       2130 (600 mg)-Given        0806 (600 mg)-Given       1646 (600 mg)-Given       2113 (600 mg)-Given        0802 (600 mg)-Given       1548 (600 mg)-Given       2137 (600 mg)-Given        0824 (600 mg)-Given       [ ] 1600       [ ] 2200           HYDROmorphone (PF) (DILAUDID) injection 0.3-0.5 mg  Dose: 0.3-0.5 mg Freq: EVERY 30 MIN PRN Route: IV  PRN Reason: severe pain  PRN Comment: or if patient unable to take PO  Start: 06/15/17 1213   Admin Instructions: Hold while on PCA.        1555 (0.5 mg)-Given       1932 (0.5 mg)-Given       2354 (0.5 mg)-Given        0055 (0.5 mg)-Given       1358 (0.5 mg)-Given       1831 (0.5 mg)-Given             lamoTRIgine (LaMICtal) tablet 100 mg  Dose: 100 mg Freq: 2 TIMES DAILY Route: PO  Start: 06/15/17 1215       1349 (100 mg)-Given       2129 (100 mg)-Given        0806 (100 mg)-Given       2113 (100 mg)-Given        0801 (100 mg)-Given      "  2022 (100 mg)-Given        0824 (100 mg)-Given       [ ] 2100           levothyroxine (SYNTHROID/LEVOTHROID) tablet 112 mcg  Dose: 112 mcg Freq: AT BEDTIME Route: PO  Indications of Use: HYPOTHYROIDISM  Start: 06/15/17 2200       2130 (112 mcg)-Given        2113 (112 mcg)-Given        2137 (112 mcg)-Given        [ ] 2200           lidocaine (LMX4) cream  Freq: EVERY 1 HOUR PRN Route: Top  PRN Reason: pain  PRN Comment: with VAD insertion or accessing implanted port.  Start: 06/15/17 1213   Admin Instructions: Do NOT give if patient has a history of allergy to any local anesthetic or any \"sabrina\" product.   Apply 30 minutes prior to VAD insertion or port access.  MAX Dose:  2.5 g (  of 5 g tube)               lidocaine 1 % 1 mL  Dose: 1 mL Freq: EVERY 1 HOUR PRN Route: OTHER  PRN Comment: mild pain with VAD insertion or accessing implanted port  Start: 06/15/17 1213   Admin Instructions: Do NOT give if patient has a history of allergy to any local anesthetic or any \"sabrina\" product. MAX dose 1 mL subcutaneous OR intradermal in divided doses.               melatonin tablet 3 mg  Dose: 3 mg Freq: AT BEDTIME PRN Route: PO  PRN Reason: sleep  Start: 06/16/17 2000   Admin Instructions: POD 1.  Do not give unless at least 6 hours of uninterrupted sleep is expected.               naloxone (NARCAN) injection 0.1-0.4 mg  Dose: 0.1-0.4 mg Freq: EVERY 2 MIN PRN Route: IV  PRN Reason: opioid reversal  Start: 06/15/17 1213   Admin Instructions: For respiratory rate LESS than or EQUAL to 8.  Partial reversal dose:  0.1 mg titrated q 2 minutes for Analgesia Side Effects Monitoring Sedation Level of 3 (frequently drowsy, arousable, drifts to sleep during conversation).Full reversal dose:  0.4 mg bolus for Analgesia Side Effects Monitoring Sedation Level of 4 (somnolent, minimal or no response to stimulation).               ondansetron (ZOFRAN-ODT) ODT tab 4 mg  Dose: 4 mg Freq: EVERY 6 HOURS PRN Route: PO  PRN Reason: nausea  Start: " 06/15/17 1213   Admin Instructions: This is Step 1 of nausea and vomiting management.  If nausea not resolved in 15 minutes, go to Step 2 prochlorperazine (COMPAZINE). Do not push through foil backing. Peel back foil and gently remove. Place on tongue immediately. Administration with liquid unnecessary              Or  ondansetron (ZOFRAN) injection 4 mg  Dose: 4 mg Freq: EVERY 6 HOURS PRN Route: IV  PRN Reasons: nausea,vomiting  Start: 06/15/17 1213   Admin Instructions: This is Step 1 of nausea and vomiting management.  If nausea not resolved in 15 minutes, go to Step 2 prochlorperazine (COMPAZINE).  Irritant.               oxyCODONE (ROXICODONE) IR tablet 5-10 mg  Dose: 5-10 mg Freq: EVERY 3 HOURS PRN Route: PO  PRN Reason: moderate to severe pain  Start: 06/15/17 1213   Admin Instructions: Hold while on PCA or with regular IV opioid dosing.  IF CrCl UNKNOWN start at lowest end of dosing range.        2130 (5 mg)-Given       2156 (5 mg)-Given        0301 (10 mg)-Given       0602 (10 mg)-Given       0909 (10 mg)-Given       1212 (10 mg)-Given       1646 (10 mg)-Given        0017 (10 mg)-Given       0811 (10 mg)-Given       1548 (10 mg)-Given       2137 (10 mg)-Given        1211 (10 mg)-Given           prochlorperazine (COMPAZINE) injection 5 mg  Dose: 5 mg Freq: EVERY 6 HOURS PRN Route: IV  PRN Reasons: nausea,vomiting  Start: 06/15/17 1213   Admin Instructions: This is Step 2 of nausea and vomiting management.   If nausea not resolved in 15 minutes, give metoclopramide (REGLAN) if ordered (step 3 of nausea and vomiting management)              Or  prochlorperazine (COMPAZINE) tablet 5 mg  Dose: 5 mg Freq: EVERY 6 HOURS PRN Route: PO  PRN Reasons: nausea,vomiting  Start: 06/15/17 1213   Admin Instructions: This is Step 2 of nausea and vomiting management.   If nausea not resolved in 15 minutes, give metoclopramide (REGLAN) if ordered (step 3 of nausea and vomiting management)               senna-docusate  (SENOKOT-S;PERICOLACE) 8.6-50 MG per tablet 1-2 tablet  Dose: 1-2 tablet Freq: 2 TIMES DAILY Route: PO  Start: 06/15/17 2100   Admin Instructions: Start with 1 tablet PO BID, If no bowel movement in 24 hours, increase to 2 tablets PO BID.  Hold for loose stools.        2130 (1 tablet)-Given        0805 (1 tablet)-Given       2113 (1 tablet)-Given        0802 (1 tablet)-Given       2022 (2 tablet)-Given        0824 (2 tablet)-Given       [ ] 2100           sertraline (ZOLOFT) tablet 150 mg  Dose: 150 mg Freq: DAILY Route: PO  Start: 06/15/17 1215       1349 (150 mg)-Given        0805 (150 mg)-Given        0802 (150 mg)-Given        0824 (150 mg)-Given           sodium chloride (PF) 0.9% PF flush 3 mL  Dose: 3 mL Freq: EVERY 8 HOURS Route: IK  Start: 06/15/17 1400   Admin Instructions: And Q1H PRN, to lock peripheral IV dormant line.        (1350)-Not Given       (2132)-Not Given        (0600)-Not Given              2113 (3 mL)-Given        0641 (3 mL)-Given       1550 (3 mL)-Given               0629 (3 mL)-Given       [ ] 1400       [ ] 2200           sodium chloride (PF) 0.9% PF flush 3 mL  Dose: 3 mL Freq: EVERY 1 HOUR PRN Route: IK  PRN Reason: line flush  PRN Comment: for peripheral IV flush post IV meds  Start: 06/15/17 1213              traZODone (DESYREL) tablet 300 mg  Dose: 300 mg Freq: AT BEDTIME Route: PO  Indications of Use: INSOMNIA  Start: 06/15/17 2200        0055 (300 mg)-Given        0014 (300 mg)-Given [C]        0044 (300 mg)-Given [C]       [ ] 2200           valACYclovir (VALTREX) tablet 1,000 mg  Dose: 1,000 mg Freq: DAILY Route: PO  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        1546 (975 mg)-Given       2355 (975 mg)-Given        0753 (975 mg)-Given       1646 (975 mg)-Given        0014 (975 mg)-Given       0801 (975 mg)-Given       1548 (975 mg)-Given        0044 (975 mg)-Given       0823 (975 mg)-Given             Dose: 1 g Freq: EVERY 8 HOURS Route: IV  Indications of Use: SURGICAL PROPHYLAXIS  Start: 06/15/17 1600   End: 06/16/17 0025   Admin Instructions: First post-op dose due 8 hours after intra-op dose, see eMAR.        1546 (1 g)-New Bag       2355 (1 g)-New Bag                Dose: 200 mg Freq: 2 TIMES DAILY WITH MEALS Route: PO  Start: 06/15/17 1800   End: 06/17/17 0801       1920 (200 mg)-Given        0806 (200 mg)-Given       1813 (200 mg)-Given        0801 (200 mg)-Given              Dose: 5-10 mg Freq: EVERY 6 HOURS PRN Route: PO  PRN Reason: other  PRN Comment: spasm  Start: 06/15/17 1213   End: 06/18/17 1211   Admin Instructions: For max of 9 doses post op with first dose given in PACU.         0055 (5 mg)-Given       0753 (10 mg)-Given       1348 (10 mg)-Given       2113 (10 mg)-Given        0811 (10 mg)-Given       1548 (10 mg)-Given       2137 (10 mg)-Given        0631 (5 mg)-Given       1211 (5 mg)-Given             Dose: 10 mg Freq: EVERY 12 HOURS Route: PO  Start: 06/15/17 1800   End: 06/18/17 0629   Admin Instructions: DO NOT CRUSH.        1920 (10 mg)-Given        0602 (10 mg)-Given       1813 (10 mg)-Given        0641 (10 mg)-Given       1852 (10 mg)-Given        0629 (10 mg)-Given          Discontinued Medications  Medications 06/12/17 06/13/17 06/14/17 06/15/17 06/16/17 06/17/17 06/18/17         Start: 06/15/17 1226   End: 06/15/17 1224   Admin Instructions: Gemma Aguilera: cabinet override        1224-Med Discontinued

## 2017-06-15 NOTE — PLAN OF CARE
Problem: Goal Outcome Summary  Goal: Goal Outcome Summary  Outcome: Improving  Pt. A&o, vss, taking IV dilaudid for pain, tolerated regular diet, segura and hvc patient, dressing CDI, Will continue to monitor.

## 2017-06-15 NOTE — PROGRESS NOTES
Admission medication history interview status for the 6/15/2017  admission is complete. See EPIC admission navigator for prior to admission medications     Medication history source reliability:Good    Medication history interview source(s):Patient    Medication history resources (including written lists, pill bottles, clinic record): Mailed in list    Primary pharmacy.[facility]    Additional medication history information not noted on PTA med list :None    Time spent in this activity: 30 minutes    Prior to Admission medications    Medication Sig Last Dose Taking? Auth Provider   Misc Natural Products (GLUCOSAMINE CHOND COMPLEX/MSM PO) Take 2 tablets by mouth At Bedtime 6/12/2017 at HS Yes Reported, Patient   ValACYclovir HCl (VALTREX PO) Take 1,000 mg by mouth daily  6/14/2017 at 0800 Yes Reported, Patient   ACETAMINOPHEN PO Take 500 mg by mouth every 8 hours as needed for pain 2 tabs 3 times daily 6/1/2017 Yes Reported, Patient   TRAZODONE HCL PO Take 300 mg by mouth At Bedtime (3 x 100mg = 300mg) 6/13/2017 at HS Yes Reported, Patient   Levothyroxine Sodium (LEVOTHROID PO) Take 112 mcg by mouth At Bedtime 6/14/2017 at 2200 Yes Reported, Patient   CYANOCOBALAMIN PO Take 1,000 mcg by mouth At Bedtime 6/12/2017 at HS Yes Reported, Patient   SERTRALINE HCL PO Take 150 mg by mouth daily (Takes 1.5 x 100mg tablet = 150mg dose) 6/14/2017 at 0800 Yes Reported, Patient   VITAMIN D, CHOLECALCIFEROL, PO Take 2,000 Units by mouth daily 6/8/2017 at HS Yes Reported, Patient   Gabapentin (NEURONTIN PO) Take 600 mg by mouth 3 times daily (Takes 2 x 300mg capsule= 600mg dose) 6/14/2017 at 2200 Yes Reported, Patient   ascorbic acid 1000 MG TABS tablet Take 1,000 mg by mouth daily TAke 2 1000mg tabs three times daily. 5/25/2017 Yes Reported, Patient   LamoTRIgine (LAMICTAL PO) Take 100 mg by mouth 2 times daily 6/14/2017 at 2200 Yes Reported, Patient   EPINEPHrine (EPIPEN) 0.3 MG/0.3ML injection Inject 0.3 mg into the muscle once as  needed for anaphylaxis emergency med Yes Reported, Patient   multivitamin, therapeutic with minerals (MULTI-VITAMIN) TABS Take 1 tablet by mouth daily 6/10/2017 at AM Yes Reported, Patient

## 2017-06-15 NOTE — IP AVS SNAPSHOT
` ` Patient Information     Patient Name Sex     RotarAmalia ([id number]) Female 1950       Room Bed    5527 5527-01      Patient Demographics     Address Phone    [address] [place] [address] [phone] (Home)  [phone] (Mobile) *Preferred*      Patient Ethnicity & Race     Ethnic Group Patient Race    American White      Emergency Contact(s)     Name Relation Home Work Mobile    Mario Campbell   [phone]    no secondary contact  none        Documents on File        Status Date Received Description       Documents for the Patient    Consent Form       Waiver - Payment       Insurance Card       Privacy Notice - [place] Received 11     External Medication Information Consent       Consent for Services - Hospital/Clinic Received () 11     Insurance Card Received 11     Waiver - Payment Received 11     HIM ROBERTA Authorization   getachew green request     HIM ROBERTA Authorization   [facility], [facility]    Consent for EHR Access  13 Copied from existing Consent for services - C/HOD collected on 2011    HIM ROBERTA Authorization - File Only   getachew loving request    [facility] Specified Other       Patient ID Received 14     Insurance Card Received 14     Consent for Services - Hospital/Clinic Received () 14     HIM ROBERTA Authorization  14     Insurance Card Received 11/10/14 [employer]    Patient ID Received 11/10/14 [place]    Consent to Communicate Received 11/10/14 PHI Auth    Patient ID Received 11/24/15 [facility]    Insurance Card Received 11/24/15 [employer]    Consent for Services - Hospital/Clinic Received () 11/24/15     Consent for Services/Privacy Notice - Hospital/Clinic Received 04/15/17     Insurance Card Received 04/15/17     External Medication Information Consent Accepted 04/15/17     Patient ID  (Deleted)         Documents for the Encounter    CMS IM for Patient Signature       H/P - History  and Physical  06/13/17 [facility], HISTORY AND PHY 05/22/2017    EKG Cardiac - HIM Scan  06/13/17 EKG, [facility] [place] [facility]    Lab Result - HIM Scan  06/13/17 LABS, [facility]      Admission Information     Attending Provider Admitting Provider Admission Type Admission Date/Time    Balta Hoffman MD Moen, Steven A, MD Elective 06/15/17  0507    Discharge Date Hospital Service Auth/Cert Status Service Area     Surgery Incomplete [facility]    Unit Room/Bed Admission Status        55 ORTHO SPEC UNIT 5527/5527-01 Admission (Confirmed)       Admission     Complaint    LEFT KNEE DJD, Status post total left knee replacement      Hospital Account     Name Acct ID Class Status Primary Coverage    Amalia Britton [id number] Inpatient Open MEDICARE - MEDICARE FOR HB SUPPLEMENT            Guarantor Account (for Hospital Account #[id number])     Name Relation to Pt Service Area Active? Acct Type    Amalia Britton  FCS Yes Personal/Family    Address Phone          [address] [address] [phone](H)  none(O)              Coverage Information (for Hospital Account #[id number])     1. MEDICARE/MEDICARE FOR HB SUPPLEMENT     F/O Payor/Plan Precert #    MEDICARE/MEDICARE FOR HB SUPPLEMENT     Subscriber Subscriber #    Amalia Britton [id number]    Address Phone    [employer]  [address]  [place] [address] [phone]          2. BCBS/BCBS PLATINUM BLUE     F/O Payor/Plan Precert #    BCBS/BCBS PLATINUM BLUE     Subscriber Subscriber #    Amalia Britton [id number]    Address Phone    [address] [address]  [place] [address] [phone]

## 2017-06-15 NOTE — OR NURSING
"Dr. Sosa at bedside. Pt restful, calm at this time - when awakens becomes tearful + anxious, states she can feel \"pressure\" at her knee. Will continue to work in dilaudid as able.   "

## 2017-06-15 NOTE — IP AVS SNAPSHOT
"` `     [place] [address] [facility]: [phone]                 INTERAGENCY TRANSFER FORM - NOTES (H&P, Discharge Summary, Consults, Procedures, Therapies)   6/15/2017                    Hospital Admission Date: 6/15/2017  JIAN WEEMS   : 1950  Sex: Female        Patient PCP Information     Provider PCP Type    Robin Martin MD General         History & Physicals      H&P signed by Krystina Morales at 2017 10:18 AM      Author:  Krystina Morales Service:  (none) Author Type:  Physician    Filed:  2017 10:18 AM Date of Service:  2017 10:14 AM Creation Time:  2017 10:18 AM    Status:  Signed :  Krystina Morales (Physician)     Carmen on 2017 10:18 AM by Carmen, Provider : INGRID, CANDEL[facility]IO [facility]Y 2017 1          Revision History        User Key Date/Time User Provider Type Action    > [N/A] 2017 10:18 AM Carmen, Provider Physician Sign                  Discharge Summaries     No notes of this type exist for this encounter.      Consult Notes     No notes of this type exist for this encounter.         Progress Notes - Physician (Notes from 17 through 17)      Progress Notes by Balta Hoffman MD at 2017 11:37 AM     Author:  Balta Hoffman MD Service:  Orthopedics Author Type:  Physician    Filed:  2017 11:40 AM Date of Service:  2017 11:37 AM Creation Time:  2017 11:37 AM    Status:  Signed :  Balta Hoffman MD (Physician)         Jian Weems  2017  POD # 2    Doing well.  No immediate surgical complications identified.  No excessive bleeding  Pain well-controlled.  Tolerating physical therapy and rehabilitation well.  Objective:  Blood pressure 95/50, pulse 72, temperature 98.6  F (37  C), temperature source Oral, resp. rate 16, height 1.6 m (5' 3\"), weight 70.3 kg (155 lb 1 oz), SpO2 (!) 89 %.    Temperatures:  Current - Temp: 98.6  F (37  C); Max - Temp  Av.8  F (37.1  C)  Min: 97.7  F (36.5  C)  Max: 99.8  F " (37.7  C)  Pulse range: No Data Recorded  Blood pressure range: Systolic (24hrs), Av , Min:86 , Max:137   ; Diastolic (24hrs), Av, Min:48, Max:65    Exam:  CMS: intact  alert, stable, dressing dry      Labs:  No results for input(s): POTASSIUM in the last 86353 hours.  Recent Labs   Lab Test  17   0650  17   0655  06/15/17   0650   HGB  9.5*  9.6*  11.7     No results for input(s): INR in the last 52858 hours.  Recent Labs   Lab Test  06/15/17   1418  04/10/17   1506   PLT  202  368  Canceled, Test credited       PLAN:  Continue physical therapy  Pain control measures[SM1.1]         Revision History        User Key Date/Time User Provider Type Action    > SM1.1 2017 11:40 AM Balta Hoffman MD Physician Sign            Progress Notes by Rosaura Ball at 2017  2:31 PM     Author:  Rosaura Ball Service:  Spiritual Health Author Type:      Filed:  2017  2:43 PM Date of Service:  2017  2:31 PM Creation Time:  2017  2:31 PM    Status:  Signed :  Rosaura Ball ()         SPIRITUAL HEALTH SERVICES Progress Note  [facility]    PRIMARY FOCUS:      Emotional/spiritual/Baptist distress    Support for coping    ILLNESS CIRCUMSTANCES:    Reviewed documentation. Reflective conversation shared with Amalia which integrated elements of illness and family narratives.         Context of Serious Illness/Symptom(s) -  Pt recovering from knee replacement    Resources for Support - Pt unable to identify sources of support      DISTRESS:      Emotional/Existential/Relational Distress - Pt's spouse  on .  She has a framed picture of him with her.  Pt says that she expected she might be shaken with grief due to this procedure, and it is still very difficult for her.  She cries on and off throughout our visit, telling the full story of her 's diagnosis of Stage IV cancer, his decline, his insistence in not telling others, and his dying  "process.  She talks of many other topics, as well, allowing SH little room to interject.  Additionally, pt speaks of strained relationships with her own son and her  's children.  Pt's medical difficulties have brought the absence of her  to roche reality as he was always the person who would take care of her.    Spiritual/Protestant Distress - Her grief spills into her spiritual being, as well, coming out as technical questions around the meaning of Caodaism.  Additionally, she does not have a spiritual home here in [place].    Social/Cultural/Economic Distress - Pt expresses stress due to having so much to do around her 's death, from liquidating his business assets to planning his memorial service for mid-July.  She says that she cannot take the time to fall apart because there is so much to be done.       SPIRITUAL/Advent (Coping):      Pentecostalism/Christa - [place].  She and her  had a meaningful Protestant community in [place] where they wintered for about five years.    Spiritual Practice(s) - Prayer, listening to an audio version of the Bible, attending Bible Study.Emotional/Existential/ Relationall/Connections - Pt expresses feeling isolated due to strained family relationships.  Bible Study with a group of women has been very meaningful for her, and while she speaks fondly of these women as \"sisters in [place],\" she says that they are busy and she could never ask them to help her.      GOALS OF CARE:    Goals of Care - Pt anticipates healing and resuming normal activities after Rehab.    Meaning/Sense-Making - Pt hopes to find her new mission, as she believes that caring for her  through his illness and death was her God-intended purpose.  As her life continues, she is hoping for purpose.      PLAN:  provided emotional and spiritual support as well as prayer.   will notify Saturday and  chaplains that pt might have further " need.  [facility] team remains available upon request.                                                                                                                 Shahla Carmichael Resident  Pager [phone][ED1.1]     Revision History        User Key Date/Time User Provider Type Action    > ED1.1 6/16/2017  2:43 PM Rosaura Ball Sign            Progress Notes by Yanna Colin LSW at 6/16/2017 11:12 AM     Author:  Yanna Colin LSW Service:  Social Work Author Type:      Filed:  6/16/2017  2:28 PM Date of Service:  6/16/2017 11:12 AM Creation Time:  6/16/2017 11:12 AM    Status:  Addendum :  Yanna Colin LSW ()         Care Transition Initial Assessment - JUNE  Reason For Consult: discharge planning  Met with: Patient   Active Problems:    Status post total left knee replacement         DATA  Lives With: alone  Living Arrangements: house  Description of Support System: Supportive, Involved  Who is your support system?: Children  Support Assessment: Adequate family and caregiver support, Adequate social supports.   Identified issues/concerns regarding health management:            Quality Of Family Relationships: supportive, involved  Transportation Available: TBD      ASSESSMENT  Cognitive Status:  Awake, alert and oriented X3.  Concerns to be addressed:     Per MD order, SW met with patient to discuss discharge planning needs.  Patient is a 66-year-old female who was admitted to the hospital on 6-15-17 for an elective left TKA.  Prior to hospitalization, patient was living alone in her own home where she was managing well.  Patient is aware that she will need to go to Rehab prior to returning home and SW spoke to Dr. Hoffman who is in agreement with this plan.  Patient expressed interest in [facility] and is requesting a private room there.  She is aware of the $40 per day fee for the private room that is not covered under her  insurance.  SW made a referral to the facility via [facility] on the [facility] to have them assess patient for a possible admission on Sunday.  JUNE will follow up regarding transportation once the discharge plan has been finalized.     PLAN  Financial costs for the patient includes: Discussed the $40 per day private room fee that is not covered under her insurance.  Patient given options and choices for discharge: TCU facility list offered.  Patient/family is agreeable to the plan?  Yes  Patient Goals and Preferences: TCU at discharge.  Patient anticipates discharging to: TCU at discharge.  Discharge Planner   Discharge Plans in progress: Referral made to TCU for Sunday.  Barriers to discharge plan: None  Follow up plan: Will follow up regarding bed availability for Sunday.       Entered by: Yanna Colin 06/16/2017 11:12 AM     FRANKLNY Kaur  [SB1.1]    JUNE  D: SW following for discharge planning needs.  SW received a message from Matt in Admissions at [facility] stating that they would have a bed available for patient on Sunday.  I: JUNE updated patient on the above and discussed transportation to get to Rehab.  Patient confirmed that family will transport her on Sunday at around 1230.  SW spoke to Matt to confirm the bed and to update her on the transport time for Sunday.  SW on Sunday to fax the discharge orders to [phone] and should direct questions or changes to the Admissions Office at [phone].  A: Patient is alert and oriented X3.  P: SW will continue to follow and assist with finalizing the discharge plan as appropriate.    FRANKLYN Kaur  [SB1.2]    PAS-RR    D: Per DHS regulation, JUNE completed and submitted PAS-RR to [place] Board on Aging Direct Connect via the Senior LinkAge Line.  PAS-RR confirmation # is : [id number].    I: JUNE spoke with patient and she is aware a PAS-RR has been submitted.  JUNE reviewed with patient that she may be contacted for a follow up  "appointment within 10 days of hospital discharge if their SNF stay is < 30 days.  Contact information for Senior LinkAge Line was also provided.    A: Patient verbalized understanding.    P: Further questions may be directed to Senior LinkAge Line at #[phone], option #4 for [facility]- staff.    FRANKLYN Kaur  [SB1.3]                        Revision History        User Key Date/Time User Provider Type Action    > SB1.3 2017  2:28 PM Yanna Colin LSW  Addend     SB1.2 2017  2:11 PM Yanna Colin LSW  Addend     SB1.1 2017 11:17 AM Yanna Colin LSW  Sign            Progress Notes by Balta Hoffman MD at 2017  8:28 AM     Author:  Balta Hoffman MD Service:  Orthopedics Author Type:  Physician    Filed:  2017  8:32 AM Date of Service:  2017  8:28 AM Creation Time:  2017  8:28 AM    Status:  Signed :  Balta Hoffman MD (Physician)         Amalia THOMAS Rotar  2017  POD # 1    Doing well.  No immediate surgical complications identified.  No excessive bleeding  Sitting up eating  Appears comfortable  Objective:  Blood pressure 101/48, pulse 72, temperature 98.8  F (37.1  C), temperature source Oral, resp. rate 14, height 1.6 m (5' 3\"), weight 70.3 kg (155 lb 1 oz), SpO2 98 %.    Temperatures:  Current - Temp: 98.8  F (37.1  C); Max - Temp  Av.5  F (36.9  C)  Min: 97.8  F (36.6  C)  Max: 99  F (37.2  C)  Pulse range: No Data Recorded  Blood pressure range: Systolic (24hrs), Av , Min:94 , Max:172   ; Diastolic (24hrs), Av, Min:44, Max:111    Exam:  CMS: intact  alert, stable, dressing dry      Labs:  No results for input(s): POTASSIUM in the last 32080 hours.  Recent Labs   Lab Test  17   0655  06/15/17   0650  04/10/17   1506   HGB  9.6*  11.7  8.2*  Canceled, Test credited  CORRECTED ON 04/10 AT 1700: PREVIOUSLY REPORTED AS Canceled, Test credited   Duplicate request SEE CBC RESULTS [place]   "     No results for input(s): INR in the last 52519 hours.  Recent Labs   Lab Test  06/15/17   1418  04/10/17   1506   PLT  202  368  Canceled, Test credited       PLAN:  Start physical therapy  Pain control measures[SM1.1]         Revision History        User Key Date/Time User Provider Type Action    > SM1.1 6/16/2017  8:32 AM Balta Hoffman MD Physician Sign            Progress Notes by Aislinn Avilez PT at 6/15/2017  3:27 PM     Author:  Aislinn Avilez PT Service:  (none) Author Type:  Physical Therapist    Filed:  6/15/2017  3:27 PM Date of Service:  6/15/2017  3:27 PM Creation Time:  6/15/2017  3:27 PM    Status:  Signed :  Aislinn Avilez PT (Physical Therapist)            06/15/17 3572   Quick Adds   Type of Visit Initial PT Evaluation   Living Environment   Lives With alone   Living Arrangements house   Home Accessibility tub/shower is not walk in   Number of Stairs to Enter Home 1  (no rails)   Number of Stairs Within Home 12  (one rail)   Transportation Available car   Living Environment Comment Patient reports she does not have any friends or family who can assist during recovery.    Self-Care   Usual Activity Tolerance good   Equipment Currently Used at Home cane, straight   Functional Level Prior   Ambulation 0-->independent   Transferring 0-->independent   Toileting 0-->independent   Bathing 0-->independent   Dressing 0-->independent   Fall history within last six months yes   Number of times patient has fallen within last six months 6   Which of the above functional risks had a recent onset or change? ambulation;transferring   Prior Functional Level Comment Patient reports independence with mobility/ADL's prior to admission.    General Information   Onset of Illness/Injury or Date of Surgery - Date 06/15/17   Referring Physician MD Dalton   Patient/Family Goals Statement To go to rehab   Pertinent History of Current Problem (include personal factors and/or comorbidities that impact the POC)  66 year old female s/p L TKA on 6/15/17. PMH significant for anxiety.    Precautions/Limitations fall precautions   Weight-Bearing Status - LLE weight-bearing as tolerated   General Info Comments Ambulate with assist   Cognitive Status Examination   Orientation orientation to person, place and time   Level of Consciousness alert;lethargic/somnolent   Follows Commands and Answers Questions able to follow single-step instructions   Pain Assessment   Patient Currently in Pain (Reports 4/10 pain the L LE)   Integumentary/Edema   Integumentary/Edema Comments Dressing to the L LE clean, dry and intact   Range of Motion (ROM)   ROM Comment Decreased L knee AROM secondary to pain and weakness   Strength   Strength Comments Poor quad set on the L LE.    Transfer Skills   Transfer Comments Mobility not assessed this session. Patient lethargic and needing assist to complete supine exercises.    Sensory Examination   Sensory Perception Comments Denies burning, numbness and tingling.    Modality Interventions   Planned Modality Interventions Cryotherapy   Planned Modality Interventions Comments PRN   General Therapy Interventions   Planned Therapy Interventions bed mobility training;gait training;ROM;strengthening;transfer training;home program guidelines;progressive activity/exercise   Clinical Impression   Criteria for Skilled Therapeutic Intervention yes, treatment indicated   PT Diagnosis Impaired gait   Influenced by the following impairments Pain, decreased L LE ROM/strength, impaired balance   Functional limitations due to impairments Decreased independence with functional mobility   Clinical Presentation Evolving/Changing   Clinical Presentation Rationale Waxing/waning alterness this session   Clinical Decision Making (Complexity) Low complexity   Therapy Frequency` 2 times/day   Predicted Duration of Therapy Intervention (days/wks) 4 days   Anticipated Equipment Needs at Discharge front wheeled walker   Anticipated  "Discharge Disposition Transitional Care Facility   Risk & Benefits of therapy have been explained Yes   Patient, Family & other staff in agreement with plan of care Yes   Clinical Impression Comments Patient appropriate for continued acute care PT to address strength/ROM deficits in order to maximize independence with functional mobility prior to discharge,   [facility] TM \"6 Clicks\"   2016, Trustees of [facility], under license to [employer].  All rights reserved.   6 Clicks Short Forms Basic Mobility Inpatient Short Form   [facility]  \"6 Clicks\" V.2 Basic Mobility Inpatient Short Form   1. Turning from your back to your side while in a flat bed without using bedrails? 2 - A Lot   2. Moving from lying on your back to sitting on the side of a flat bed without using bedrails? 2 - A Lot   3. Moving to and from a bed to a chair (including a wheelchair)? 1 - Total   4. Standing up from a chair using your arms (e.g., wheelchair, or bedside chair)? 1 - Total   5. To walk in hospital room? 1 - Total   6. Climbing 3-5 steps with a railing? 1 - Total   Basic Mobility Raw Score (Score out of 24.Lower scores equate to lower levels of function) 8   Total Evaluation Time   Total Evaluation Time (Minutes) 10[BB1.1]        Revision History        User Key Date/Time User Provider Type Action    > BB1.1 6/15/2017  3:27 PM Aislinn Avilez, PT Physical Therapist Sign            Progress Notes by Rosalinda Macdonald at 6/15/2017  6:08 AM     Author:  Rosalinda Macdonald Service:  (none) Author Type:  (none)    Filed:  6/15/2017  6:09 AM Date of Service:  6/15/2017  6:08 AM Creation Time:  6/15/2017  6:08 AM    Status:  Signed :  Rosalinda Macdonald         Admission medication history interview status for the 6/15/2017  admission is complete. See EPIC admission navigator for prior to admission medications     Medication history source reliability:Good    Medication history interview source(s):Patient    Medication " history resources (including written lists, pill bottles, clinic record): Mailed in list    Primary pharmacy.[facility]    Additional medication history information not noted on PTA med list :None    Time spent in this activity: 30 minutes    Prior to Admission medications    Medication Sig Last Dose Taking? Auth Provider   Misc Natural Products (GLUCOSAMINE CHOND COMPLEX/MSM PO) Take 2 tablets by mouth At Bedtime 6/12/2017 at HS Yes Reported, Patient   ValACYclovir HCl (VALTREX PO) Take 1,000 mg by mouth daily  6/14/2017 at 0800 Yes Reported, Patient   ACETAMINOPHEN PO Take 500 mg by mouth every 8 hours as needed for pain 2 tabs 3 times daily 6/1/2017 Yes Reported, Patient   TRAZODONE HCL PO Take 300 mg by mouth At Bedtime (3 x 100mg = 300mg) 6/13/2017 at HS Yes Reported, Patient   Levothyroxine Sodium (LEVOTHROID PO) Take 112 mcg by mouth At Bedtime 6/14/2017 at 2200 Yes Reported, Patient   CYANOCOBALAMIN PO Take 1,000 mcg by mouth At Bedtime 6/12/2017 at HS Yes Reported, Patient   SERTRALINE HCL PO Take 150 mg by mouth daily (Takes 1.5 x 100mg tablet = 150mg dose) 6/14/2017 at 0800 Yes Reported, Patient   VITAMIN D, CHOLECALCIFEROL, PO Take 2,000 Units by mouth daily 6/8/2017 at HS Yes Reported, Patient   Gabapentin (NEURONTIN PO) Take 600 mg by mouth 3 times daily (Takes 2 x 300mg capsule= 600mg dose) 6/14/2017 at 2200 Yes Reported, Patient   ascorbic acid 1000 MG TABS tablet Take 1,000 mg by mouth daily TAke 2 1000mg tabs three times daily. 5/25/2017 Yes Reported, Patient   LamoTRIgine (LAMICTAL PO) Take 100 mg by mouth 2 times daily 6/14/2017 at 2200 Yes Reported, Patient   EPINEPHrine (EPIPEN) 0.3 MG/0.3ML injection Inject 0.3 mg into the muscle once as needed for anaphylaxis emergency med Yes Reported, Patient   multivitamin, therapeutic with minerals (MULTI-VITAMIN) TABS Take 1 tablet by mouth daily 6/10/2017 at AM Yes Reported, Patient[AZ1.1]            Revision History        User Key Date/Time User Provider  Type Action    > AZ1.1 6/15/2017  6:09 AM Rosalinda Macdonald (none) Sign                  Procedure Notes     No notes of this type exist for this encounter.         Progress Notes - Therapies (Notes from 06/14/17 through 06/17/17)      Progress Notes by Aislinn Avilez PT at 6/15/2017  3:27 PM     Author:  Aislinn Avilez PT Service:  (none) Author Type:  Physical Therapist    Filed:  6/15/2017  3:27 PM Date of Service:  6/15/2017  3:27 PM Creation Time:  6/15/2017  3:27 PM    Status:  Signed :  Aislinn Avilez PT (Physical Therapist)            06/15/17 1850   Quick Adds   Type of Visit Initial PT Evaluation   Living Environment   Lives With alone   Living Arrangements house   Home Accessibility tub/shower is not walk in   Number of Stairs to Enter Home 1  (no rails)   Number of Stairs Within Home 12  (one rail)   Transportation Available car   Living Environment Comment Patient reports she does not have any friends or family who can assist during recovery.    Self-Care   Usual Activity Tolerance good   Equipment Currently Used at Home cane, straight   Functional Level Prior   Ambulation 0-->independent   Transferring 0-->independent   Toileting 0-->independent   Bathing 0-->independent   Dressing 0-->independent   Fall history within last six months yes   Number of times patient has fallen within last six months 6   Which of the above functional risks had a recent onset or change? ambulation;transferring   Prior Functional Level Comment Patient reports independence with mobility/ADL's prior to admission.    General Information   Onset of Illness/Injury or Date of Surgery - Date 06/15/17   Referring Physician MD Dalton   Patient/Family Goals Statement To go to rehab   Pertinent History of Current Problem (include personal factors and/or comorbidities that impact the POC) 66 year old female s/p L TKA on 6/15/17. PMH significant for anxiety.    Precautions/Limitations fall precautions   Weight-Bearing Status - LLE  weight-bearing as tolerated   General Info Comments Ambulate with assist   Cognitive Status Examination   Orientation orientation to person, place and time   Level of Consciousness alert;lethargic/somnolent   Follows Commands and Answers Questions able to follow single-step instructions   Pain Assessment   Patient Currently in Pain (Reports 4/10 pain the L LE)   Integumentary/Edema   Integumentary/Edema Comments Dressing to the L LE clean, dry and intact   Range of Motion (ROM)   ROM Comment Decreased L knee AROM secondary to pain and weakness   Strength   Strength Comments Poor quad set on the L LE.    Transfer Skills   Transfer Comments Mobility not assessed this session. Patient lethargic and needing assist to complete supine exercises.    Sensory Examination   Sensory Perception Comments Denies burning, numbness and tingling.    Modality Interventions   Planned Modality Interventions Cryotherapy   Planned Modality Interventions Comments PRN   General Therapy Interventions   Planned Therapy Interventions bed mobility training;gait training;ROM;strengthening;transfer training;home program guidelines;progressive activity/exercise   Clinical Impression   Criteria for Skilled Therapeutic Intervention yes, treatment indicated   PT Diagnosis Impaired gait   Influenced by the following impairments Pain, decreased L LE ROM/strength, impaired balance   Functional limitations due to impairments Decreased independence with functional mobility   Clinical Presentation Evolving/Changing   Clinical Presentation Rationale Waxing/waning alterness this session   Clinical Decision Making (Complexity) Low complexity   Therapy Frequency` 2 times/day   Predicted Duration of Therapy Intervention (days/wks) 4 days   Anticipated Equipment Needs at Discharge front wheeled walker   Anticipated Discharge Disposition Transitional Care Facility   Risk & Benefits of therapy have been explained Yes   Patient, Family & other staff in agreement  "with plan of care Yes   Clinical Impression Comments Patient appropriate for continued acute care PT to address strength/ROM deficits in order to maximize independence with functional mobility prior to discharge,   [facility] TM \"6 Clicks\"   2016, Trustees of [facility], under license to [employer].  All rights reserved.   6 Clicks Short Forms Basic Mobility Inpatient Short Form   [facility] AM-PAC  \"6 Clicks\" V.2 Basic Mobility Inpatient Short Form   1. Turning from your back to your side while in a flat bed without using bedrails? 2 - A Lot   2. Moving from lying on your back to sitting on the side of a flat bed without using bedrails? 2 - A Lot   3. Moving to and from a bed to a chair (including a wheelchair)? 1 - Total   4. Standing up from a chair using your arms (e.g., wheelchair, or bedside chair)? 1 - Total   5. To walk in hospital room? 1 - Total   6. Climbing 3-5 steps with a railing? 1 - Total   Basic Mobility Raw Score (Score out of 24.Lower scores equate to lower levels of function) 8   Total Evaluation Time   Total Evaluation Time (Minutes) 10[BB1.1]        Revision History        User Key Date/Time User Provider Type Action    > BB1.1 6/15/2017  3:27 PM Aislinn Avilez, PT Physical Therapist Sign            Progress Notes by Rosalinda Macdonald at 6/15/2017  6:08 AM     Author:  Rosalinda Macdonald Service:  (none) Author Type:  (none)    Filed:  6/15/2017  6:09 AM Date of Service:  6/15/2017  6:08 AM Creation Time:  6/15/2017  6:08 AM    Status:  Signed :  Rosalinda Macdonald         Admission medication history interview status for the 6/15/2017  admission is complete. See EPIC admission navigator for prior to admission medications     Medication history source reliability:Good    Medication history interview source(s):Patient    Medication history resources (including written lists, pill bottles, clinic record): Mailed in list    Primary pharmacy.[facility]    Additional medication " history information not noted on PTA med list :None    Time spent in this activity: 30 minutes    Prior to Admission medications    Medication Sig Last Dose Taking? Auth Provider   Misc Natural Products (GLUCOSAMINE CHOND COMPLEX/MSM PO) Take 2 tablets by mouth At Bedtime 6/12/2017 at HS Yes Reported, Patient   ValACYclovir HCl (VALTREX PO) Take 1,000 mg by mouth daily  6/14/2017 at 0800 Yes Reported, Patient   ACETAMINOPHEN PO Take 500 mg by mouth every 8 hours as needed for pain 2 tabs 3 times daily 6/1/2017 Yes Reported, Patient   TRAZODONE HCL PO Take 300 mg by mouth At Bedtime (3 x 100mg = 300mg) 6/13/2017 at HS Yes Reported, Patient   Levothyroxine Sodium (LEVOTHROID PO) Take 112 mcg by mouth At Bedtime 6/14/2017 at 2200 Yes Reported, Patient   CYANOCOBALAMIN PO Take 1,000 mcg by mouth At Bedtime 6/12/2017 at HS Yes Reported, Patient   SERTRALINE HCL PO Take 150 mg by mouth daily (Takes 1.5 x 100mg tablet = 150mg dose) 6/14/2017 at 0800 Yes Reported, Patient   VITAMIN D, CHOLECALCIFEROL, PO Take 2,000 Units by mouth daily 6/8/2017 at HS Yes Reported, Patient   Gabapentin (NEURONTIN PO) Take 600 mg by mouth 3 times daily (Takes 2 x 300mg capsule= 600mg dose) 6/14/2017 at 2200 Yes Reported, Patient   ascorbic acid 1000 MG TABS tablet Take 1,000 mg by mouth daily TAke 2 1000mg tabs three times daily. 5/25/2017 Yes Reported, Patient   LamoTRIgine (LAMICTAL PO) Take 100 mg by mouth 2 times daily 6/14/2017 at 2200 Yes Reported, Patient   EPINEPHrine (EPIPEN) 0.3 MG/0.3ML injection Inject 0.3 mg into the muscle once as needed for anaphylaxis emergency med Yes Reported, Patient   multivitamin, therapeutic with minerals (MULTI-VITAMIN) TABS Take 1 tablet by mouth daily 6/10/2017 at AM Yes Reported, Patient[AZ1.1]            Revision History        User Key Date/Time User Provider Type Action    > AZ1.1 6/15/2017  6:09 AM Rosalinda Macdonald (none) Sign

## 2017-06-15 NOTE — IP AVS SNAPSHOT
"[place] [address] [facility]: [phone]                                              INTERAGENCY TRANSFER FORM - PHYSICIAN ORDERS   6/15/2017                    Hospital Admission Date: 6/15/2017  JIAN WEEMS   : 1950  Sex: Female        Attending Provider: Balta Hoffman MD     Allergies:  Cold Medicine [Gnp Flu Cold-cough], Bees, Nuts    Infection:  None   Service:  SURGERY    Ht:  1.6 m (5' 3\")   Wt:  70.3 kg (155 lb 1 oz)   Admission Wt:  70.3 kg (155 lb 1 oz)    BMI:  27.47 kg/m 2   BSA:  1.77 m 2            Patient PCP Information     Provider PCP Type    Robin Martin MD General      ED Clinical Impression     Diagnosis Description Comment Added By Time Added    Status post total left knee replacement [Z96.652] Status post total left knee replacement [Z96.652]  Balta Hoffman MD 2017 11:42 AM      Hospital Problems as of 2017              Priority Class Noted POA    Status post total left knee replacement Medium  6/15/2017 Yes      Non-Hospital Problems as of 2017              Priority Class Noted    Anemia, unspecified Medium  2017    Impaired intestinal absorption Medium  2017    Postsurgical nonabsorption Medium  2017    Need for prophylactic immunotherapy Medium  2017    Adverse effect of iron or its compound Medium  2017      Code Status History     Date Active Date Inactive Code Status Order ID Comments User Context    This patient has a current code status but no historical code status.         Medication Review      START taking        Dose / Directions Comments    aspirin 325 MG tablet        Dose:  325 mg   Take 1 tablet (325 mg) by mouth 2 times daily   Quantity:  90 tablet   Refills:  0        celecoxib 200 MG capsule   Commonly known as:  celeBREX        Dose:  200 mg   Take 1 capsule (200 mg) by mouth daily   Quantity:  30 capsule   Refills:  0        * oxyCODONE 10 MG 12 hr tablet   Commonly known as:  OXYCONTIN        " Dose:  10 mg   Take 1 tablet (10 mg) by mouth every 12 hours   Refills:  0    1 tab po bid x 3 days then qhs x 4 days       * oxyCODONE 5 MG IR tablet   Commonly known as:  ROXICODONE        Dose:  5-10 mg   Take 1-2 tablets (5-10 mg) by mouth every 3 hours as needed for moderate to severe pain   Quantity:  60 tablet   Refills:  0        senna-docusate 8.6-50 MG per tablet   Commonly known as:  SENOKOT-S;PERICOLACE        Dose:  1-2 tablet   Take 1-2 tablets by mouth 2 times daily   Quantity:  100 tablet   Refills:  0        * Notice:  This list has 2 medication(s) that are the same as other medications prescribed for you. Read the directions carefully, and ask your doctor or other care provider to review them with you.      CONTINUE these medications which have NOT CHANGED        Dose / Directions Comments    ACETAMINOPHEN PO        Dose:  500 mg   Take 500 mg by mouth every 8 hours as needed for pain 2 tabs 3 times daily   Refills:  0        ascorbic acid 1000 MG Tabs tablet        Dose:  1000 mg   Take 1,000 mg by mouth daily TAke 2 1000mg tabs three times daily.   Refills:  0        CYANOCOBALAMIN PO        Dose:  1000 mcg   Take 1,000 mcg by mouth At Bedtime   Refills:  0        EPINEPHrine 0.3 MG/0.3ML injection        Dose:  0.3 mg   Inject 0.3 mg into the muscle once as needed for anaphylaxis   Refills:  0        GLUCOSAMINE CHOND COMPLEX/MSM PO        Dose:  2 tablet   Take 2 tablets by mouth At Bedtime   Refills:  0        LAMICTAL PO        Dose:  100 mg   Take 100 mg by mouth 2 times daily   Refills:  0        LEVOTHROID PO   Indication:  Underactive Thyroid        Dose:  112 mcg   Take 112 mcg by mouth At Bedtime   Refills:  0        Multi-vitamin Tabs tablet        Dose:  1 tablet   Take 1 tablet by mouth daily   Refills:  0        NEURONTIN PO        Dose:  600 mg   Take 600 mg by mouth 3 times daily (Takes 2 x 300mg capsule= 600mg dose)   Refills:  0        SERTRALINE HCL PO        Dose:  150 mg    Take 150 mg by mouth daily (Takes 1.5 x 100mg tablet = 150mg dose)   Refills:  0        TRAZODONE HCL PO   Indication:  Trouble Sleeping        Dose:  300 mg   Take 300 mg by mouth At Bedtime (3 x 100mg = 300mg)   Refills:  0        VALTREX PO        Dose:  1000 mg   Take 1,000 mg by mouth daily   Refills:  0        VITAMIN D (CHOLECALCIFEROL) PO        Dose:  2000 Units   Take 2,000 Units by mouth daily   Refills:  0                  Further instructions from your care team       Patient will discharge to [facility] today via family around 12:30.  [facility]'s phone number is [phone].    After Care     Activity - Up ad ellie           Additional Discharge Instructions       Knee immobilizer qhs x 7 days after discharge  May shower, no need to cover wound       Weight bearing status       wbat       Wound care       Site:   left knee  Instructions:  ice       Wound care (specify)       Site:   Left knee  Instructions:  Daily dry dressing changes until wound dry then leave open to air             Referrals     Occupational Therapy Adult Consult       Evaluate and treat as clinically indicated.    Reason:  tka       Physical Therapy Adult Consult       Evaluate and treat as clinically indicated.    Reason:  tka             Supplies     AntiEmbolism Stockings       Bilateral below knee length.On in the morning, off at night             Follow-Up Appointment Instructions     Future Labs/Procedures    Follow Up and recommended labs and tests     Comments:    {2 weeks post op with Dr. Hoffman      Follow-Up Appointment Instructions     Follow Up and recommended labs and tests       {2 weeks post op with Dr. Hoffman             Statement of Approval     Ordered          06/17/17 1151  I have reviewed and agree with all the recommendations and orders detailed in this document.  EFFECTIVE NOW     Approved and electronically signed by:  Balta Hoffman MD

## 2017-06-15 NOTE — IP AVS SNAPSHOT
"[place] [address] [facility]: [phone]                                              INTERAGENCY TRANSFER FORM - LAB / IMAGING / EKG / EMG RESULTS   6/15/2017                    Hospital Admission Date: 6/15/2017  JIAN GALVINR   : 1950  Sex: Female        Attending Provider: Balta Hoffman MD     Allergies:  Cold Medicine [Gnp Flu Cold-cough], Bees, Nuts    Infection:  None   Service:  SURGERY    Ht:  1.6 m (5' 3\")   Wt:  70.3 kg (155 lb 1 oz)   Admission Wt:  70.3 kg (155 lb 1 oz)    BMI:  27.47 kg/m 2   BSA:  1.77 m 2            Patient PCP Information     Provider PCP Type    Rboin Martin MD General         Lab Results - 3 Days      Glucose [[id number]]  Resulted: 17 0721, Result status: Final result    Ordering provider: Balta Hoffman MD  17 0000 Resulting lab: [facility]    Specimen Information    Type Source Collected On   Blood  17 0650          Components       Value Reference Range Flag Lab   Glucose 95 70 - 99 mg/dL  FrStHsLb            Hemoglobin [[id number]] (Abnormal)  Resulted: 17 0711, Result status: Final result    Ordering provider: Balta Hoffman MD  17 0000 Resulting lab: [facility]    Specimen Information    Type Source Collected On   Blood  17 0650          Components       Value Reference Range Flag Lab   Hemoglobin 9.5 11.7 - 15.7 g/dL L FrStHsLb            Hemoglobin [[id number]] (Abnormal)  Resulted: 17 0715, Result status: Final result    Ordering provider: Balta Hoffman MD  17 0000 Resulting lab: [facility]    Specimen Information    Type Source Collected On   Blood  17 0655          Components       Value Reference Range Flag Lab   Hemoglobin 9.6 11.7 - 15.7 g/dL L FrStHsLb            Creatinine [[id number]]  Resulted: 06/15/17 1439, Result status: Final result    Ordering provider: Balta Hoffman MD  06/15/17 1213 Resulting lab: [facility]" [facility]    Specimen Information    Type Source Collected On   Blood  06/15/17 1418          Components       Value Reference Range Flag Lab   Creatinine 0.56 0.52 - 1.04 mg/dL  FrStHsLb   GFR Estimate -- >60 mL/min/1.7m2  FrStHsLb   Result:         >90  Non  GFR Calc     GFR Estimate If Black -- >60 mL/min/1.7m2  FrStHsLb   Result:         >90   GFR Calc              Platelet count [[id number]]  Resulted: 06/15/17 1432, Result status: Final result    Ordering provider: Balta Hoffman MD  06/15/17 1213 Resulting lab: [facility]    Specimen Information    Type Source Collected On   Blood  06/15/17 1418          Components       Value Reference Range Flag Lab   Platelet Count 202 150 - 450 10e9/L  FrStHsLb            Hemoglobin [[id number]]  Resulted: 06/15/17 0702, Result status: Final result    Ordering provider: Devendra Nicole MD  06/15/17 0616 Resulting lab: [facility]    Specimen Information    Type Source Collected On   Blood  06/15/17 0650          Components       Value Reference Range Flag Lab   Hemoglobin 11.7 11.7 - 15.7 g/dL  FrStHsLb            Testing Performed By     Lab - Abbreviation Name Director Address Valid Date Range    14 - FrStHsLb [facility] Unknown [address] [place] [address] 05/08/15 1057 - Present            Unresulted Labs (24h ago through future)    Start       Ordered    06/18/17 0600  Platelet count  (enoxaparin (LOVENOX) (Weight  kg with CrCl greater than 30 mL/min is prechecked))  EVERY THREE DAYS,   Routine     Comments:  Repeat every 3 days while on VTE prophylaxis. If no result is listed, this lab has not been done the past 365 days. LATEST LAB RESULT: Platelet Count (10e9/L)       Date                     Value                 04/10/2017                                 Canceled, Test credited       04/10/2017               368              ----------      06/15/17 1213     06/18/17 0600  Creatinine  EVERY THREE DAYS,   Routine      06/15/17 1329    Unscheduled  Hemoglobin  CONDITIONAL X 2,   Routine     Comments:  Release on POD 1 and POD 2 if the morning Hgb is less than 8.0    06/15/17 1213         Imaging Results - 3 Days      XR Knee Port Left 1/2 Views [[id number]]  Resulted: 06/15/17 1110, Result status: Final result    Ordering provider: Balta Hoffman MD  06/15/17 1011 Resulted by: Deangelo Ladd MD    Performed: 06/15/17 1049 - 06/15/17 1050 Resulting lab: RADIOLOGY RESULTS    Narrative:       XR KNEE PORT LT 1/2 VW 6/15/2017 10:50 AM    HISTORY: Postop.    COMPARISON: None.      Impression:       IMPRESSION: Status post knee arthroplasty.  Hardware is intact.  Alignment is anatomic. There is a surgical drain within the knee  joint.    DEANGELO LADD MD      Testing Performed By     Lab - Abbreviation Name Director Address Valid Date Range    104 - Rad Rslts RADIOLOGY RESULTS Unknown Unknown 02/16/05 1553 - Present                Encounter-Level Documents:     There are no encounter-level documents.      Order-Level Documents - 06/15/2017:            Scan on 6/13/2017 10:19 AM by Outside, Provider : EKG, [facility] (below)

## 2017-06-15 NOTE — IP AVS SNAPSHOT
MRN:[id number]                      After Visit Summary   6/15/2017    Amalia Britton    MRN: [id number]           Thank you!     Thank you for choosing [facility] for your care. Our goal is always to provide you with excellent care. Hearing back from our patients is one way we can continue to improve our services. Please take a few minutes to complete the written survey that you may receive in the mail after you visit with us. Thank you!        Patient Information     Date Of Birth          1950        Designated Caregiver       Most Recent Value    Caregiver    Will someone help with your care after discharge? yes    Name of designated caregiver Uriel Campbell    Phone number of caregiver [phone]    Caregiver address See demographics      About your hospital stay     You were admitted on:  Tamiko 15, 2017 You last received care in the:  [place] [address] [facility]    You were discharged on:  June 18, 2017       Who to Call     For medical emergencies, please call 911.  For non-urgent questions about your medical care, please call your primary care provider or clinic, [phone]  For questions related to your surgery, please call your surgery clinic        Attending Provider     Provider Specialty    Balta Hoffman MD Orthopedics       Primary Care Provider Office Phone # Fax #    Robin Martin [phone]      After Care Instructions     Activity - Up ad ellie           Additional Discharge Instructions       Knee immobilizer qhs x 7 days after discharge  May shower, no need to cover wound            Weight bearing status       wbat            Wound care       Site:   left knee  Instructions:  ice            Wound care (specify)       Site:   Left knee  Instructions:  Daily dry dressing changes until wound dry then leave open to air                  Follow-up Appointments     Follow Up and recommended labs and tests       {2 weeks post op with Dr. Hoffman              "     Additional Services     Occupational Therapy Adult Consult       Evaluate and treat as clinically indicated.    Reason:  tka            Physical Therapy Adult Consult       Evaluate and treat as clinically indicated.    Reason:  tka                  Future tests that were ordered for you     AntiEmbolism Stockings       Bilateral below knee length.On in the morning, off at night                  Further instructions from your care team       Patient will discharge to [facility] today via family around 12:30.  [facility]'s phone number is [phone].    Pending Results     Date and Time Order Name Status Description    5/22/2017 0000 EKG CARDIAC - HIM SCAN Preliminary             Statement of Approval     Ordered          06/17/17 1151  I have reviewed and agree with all the recommendations and orders detailed in this document.  EFFECTIVE NOW     Approved and electronically signed by:  Balta Hoffman MD             Admission Information     Date & Time Provider Department Dept. Phone    6/15/2017 Balta Hoffman MD [place] [address] [facility] [phone]      Your Vitals Were     Blood Pressure Pulse Temperature Respirations Height Weight    93/48 72 98.9  F (37.2  C) (Oral) 16 1.6 m (5' 3\") 70.3 kg (155 lb 1 oz)    Pulse Oximetry BMI (Body Mass Index)                94% 27.47 kg/m2          MyChart Information     [facility] lets you send messages to your doctor, view your test results, renew your prescriptions, schedule appointments and more. To sign up, go to [email or web address] . Click on \"Log in\" on the left side of the screen, which will take you to the Welcome page. Then click on \"Sign up Now\" on the right side of the page.     You will be asked to enter the access code listed below, as well as some personal information. Please follow the directions to create your username and password.     Your access code is: [email or web address]  Expires: 7/14/2017  1:44 PM     Your " access code will  in 90 days. If you need help or a new code, please call your [facility] clinic or [phone].        Care EveryWhere ID     This is your Care EveryWhere ID. This could be used by other organizations to access your [facility] medical records  [id number]           Review of your medicines      START taking        Dose / Directions    aspirin 325 MG tablet        Dose:  325 mg   Take 1 tablet (325 mg) by mouth 2 times daily   Quantity:  90 tablet   Refills:  0       celecoxib 200 MG capsule   Commonly known as:  celeBREX        Dose:  200 mg   Take 1 capsule (200 mg) by mouth daily   Quantity:  30 capsule   Refills:  0       * oxyCODONE 10 MG 12 hr tablet   Commonly known as:  OXYCONTIN        Dose:  10 mg   Take 1 tablet (10 mg) by mouth every 12 hours   Refills:  0       * oxyCODONE 5 MG IR tablet   Commonly known as:  ROXICODONE        Dose:  5-10 mg   Take 1-2 tablets (5-10 mg) by mouth every 3 hours as needed for moderate to severe pain   Quantity:  60 tablet   Refills:  0       senna-docusate 8.6-50 MG per tablet   Commonly known as:  SENOKOT-S;PERICOLACE        Dose:  1-2 tablet   Take 1-2 tablets by mouth 2 times daily   Quantity:  100 tablet   Refills:  0       * Notice:  This list has 2 medication(s) that are the same as other medications prescribed for you. Read the directions carefully, and ask your doctor or other care provider to review them with you.      CONTINUE these medicines which have NOT CHANGED        Dose / Directions    ACETAMINOPHEN PO        Dose:  500 mg   Take 500 mg by mouth every 8 hours as needed for pain 2 tabs 3 times daily   Refills:  0       ascorbic acid 1000 MG Tabs tablet        Dose:  1000 mg   Take 1,000 mg by mouth daily TAke 2 1000mg tabs three times daily.   Refills:  0       CYANOCOBALAMIN PO        Dose:  1000 mcg   Take 1,000 mcg by mouth At Bedtime   Refills:  0       EPINEPHrine 0.3 MG/0.3ML injection        Dose:  0.3 mg   Inject 0.3 mg into the  muscle once as needed for anaphylaxis   Refills:  0       GLUCOSAMINE CHOND COMPLEX/MSM PO        Dose:  2 tablet   Take 2 tablets by mouth At Bedtime   Refills:  0       LAMICTAL PO        Dose:  100 mg   Take 100 mg by mouth 2 times daily   Refills:  0       LEVOTHROID PO   Indication:  Underactive Thyroid        Dose:  112 mcg   Take 112 mcg by mouth At Bedtime   Refills:  0       Multi-vitamin Tabs tablet        Dose:  1 tablet   Take 1 tablet by mouth daily   Refills:  0       NEURONTIN PO        Dose:  600 mg   Take 600 mg by mouth 3 times daily (Takes 2 x 300mg capsule= 600mg dose)   Refills:  0       SERTRALINE HCL PO        Dose:  150 mg   Take 150 mg by mouth daily (Takes 1.5 x 100mg tablet = 150mg dose)   Refills:  0       TRAZODONE HCL PO   Indication:  Trouble Sleeping        Dose:  300 mg   Take 300 mg by mouth At Bedtime (3 x 100mg = 300mg)   Refills:  0       VALTREX PO        Dose:  1000 mg   Take 1,000 mg by mouth daily   Refills:  0       VITAMIN D (CHOLECALCIFEROL) PO        Dose:  2000 Units   Take 2,000 Units by mouth daily   Refills:  0            Where to get your medicines      Some of these will need a paper prescription and others can be bought over the counter. Ask your nurse if you have questions.     You don't need a prescription for these medications     aspirin 325 MG tablet    celecoxib 200 MG capsule    senna-docusate 8.6-50 MG per tablet         Information about where to get these medications is not yet available     ! Ask your nurse or doctor about these medications     oxyCODONE 10 MG 12 hr tablet    oxyCODONE 5 MG IR tablet                Protect others around you: Learn how to safely use, store and throw away your medicines at www.disposemymeds.org.             Medication List: This is a list of all your medications and when to take them. Check marks below indicate your daily home schedule. Keep this list as a reference.      Medications           Morning Afternoon Evening  Bedtime As Needed    ACETAMINOPHEN PO   Take 500 mg by mouth every 8 hours as needed for pain 2 tabs 3 times daily   Last time this was given:  975 mg on 6/18/2017  8:23 AM                                ascorbic acid 1000 MG Tabs tablet   Take 1,000 mg by mouth daily TAke 2 1000mg tabs three times daily.                                aspirin 325 MG tablet   Take 1 tablet (325 mg) by mouth 2 times daily                                celecoxib 200 MG capsule   Commonly known as:  celeBREX   Take 1 capsule (200 mg) by mouth daily   Last time this was given:  200 mg on 6/18/2017  8:24 AM                                CYANOCOBALAMIN PO   Take 1,000 mcg by mouth At Bedtime                                EPINEPHrine 0.3 MG/0.3ML injection   Inject 0.3 mg into the muscle once as needed for anaphylaxis                                GLUCOSAMINE CHOND COMPLEX/MSM PO   Take 2 tablets by mouth At Bedtime                                LAMICTAL PO   Take 100 mg by mouth 2 times daily   Last time this was given:  100 mg on 6/18/2017  8:24 AM                                LEVOTHROID PO   Take 112 mcg by mouth At Bedtime   Last time this was given:  112 mcg on 6/17/2017  9:37 PM                                Multi-vitamin Tabs tablet   Take 1 tablet by mouth daily                                NEURONTIN PO   Take 600 mg by mouth 3 times daily (Takes 2 x 300mg capsule= 600mg dose)   Last time this was given:  600 mg on 6/18/2017  8:24 AM                                * oxyCODONE 10 MG 12 hr tablet   Commonly known as:  OXYCONTIN   Take 1 tablet (10 mg) by mouth every 12 hours   Last time this was given:  10 mg on 6/18/2017  6:29 AM                                * oxyCODONE 5 MG IR tablet   Commonly known as:  ROXICODONE   Take 1-2 tablets (5-10 mg) by mouth every 3 hours as needed for moderate to severe pain   Last time this was given:  10 mg on 6/18/2017 12:11 PM                                senna-docusate 8.6-50 MG per  tablet   Commonly known as:  SENOKOT-S;PERICOLACE   Take 1-2 tablets by mouth 2 times daily   Last time this was given:  2 tablets on 6/18/2017  8:24 AM                                SERTRALINE HCL PO   Take 150 mg by mouth daily (Takes 1.5 x 100mg tablet = 150mg dose)   Last time this was given:  150 mg on 6/18/2017  8:24 AM                                TRAZODONE HCL PO   Take 300 mg by mouth At Bedtime (3 x 100mg = 300mg)   Last time this was given:  300 mg on 6/18/2017 12:44 AM                                VALTREX PO   Take 1,000 mg by mouth daily   Last time this was given:  1,000 mg on 6/18/2017  8:24 AM                                VITAMIN D (CHOLECALCIFEROL) PO   Take 2,000 Units by mouth daily                                * Notice:  This list has 2 medication(s) that are the same as other medications prescribed for you. Read the directions carefully, and ask your doctor or other care provider to review them with you.

## 2017-06-15 NOTE — OP NOTE
DATE OF SURGERY:  06/15/2017.      PREOPERATIVE DIAGNOSIS:  Left knee osteoarthrosis.      POSTOPERATIVE DIAGNOSIS:  Left knee osteoarthrosis.      PROCEDURE PERFORMED:  Left total knee arthroplasty.      SURGEON:  Balta Hoffman MD       ASSISTANT:  JACOB Thompson       ANESTHESIA:  Regional.      COMPLICATIONS:  None.      OPERATIVE COURSE:  Ms. Amalia Britton was brought to the operating room.  An anesthetic was administered.  She received prophylactic antibiotics.  These will be discontinued within 24 hours of surgery.  She will be on Lovenox for DVT prophylaxis and switched over to aspirin.  Her left lower extremity was prepped and draped in the usual sterile fashion and timeout was called.      The limb was exsanguinated and tourniquet inflated.  I made a sharp incision from the superior pole of patella to the tibial tubercle.  Gained the extensor mechanism.  Made a medial arthrotomy.  Subluxed but did not oksana the patella.  Medium straw-colored effusion was evacuated.  There was full-thickness chondral loss in the lateral compartment in the flexion gap on both the femur and the tibia.  Placed intramedullary guide in the femur.  Cut a 6 degree distal cut.  Took an additional mm due to the planned PS component.  Went ahead and sized it for 65.  Made my 5-in-1 cuts and box cut.  I then set the tibial extramedullary cutting guide in the long axis of the tibia.  I took 3 mm from the more deficient area of the tibial plateau.  Measured it for a 67 baseplate.  This was tight in extension.  Therefore, I took 2 more mm of bone.  Ultimately was able to get a 12 in.  Cemented in place with a 12.  Did a 34 button on the patella.  A 12 was quite tight in extension.  A 14 would not come into full extension.  I therefore elected a 10.  No lateral release was needed.  During the cementation, I did copiously irrigate and then soak with a dilute Betadine solution for 3 minutes.  I then irrigated with antibiotic-containing saline  solution.  Placed my final components.  Closed over a drain.  Ethibond on the extensor mechanism proximally, 0 Vicryl distally along the patellar tendon, 2-0 in the subq, staples in the skin.  Bulky sterile dressing was applied.  She emerged from anesthesia without difficulty.  Returned to the recovery room in stable condition.  All sponge and needle counts were correct at the end of the procedure.  There were no known complications.         GOMEZ ARCEO MD             D: 06/15/2017 09:33   T: 06/15/2017 11:28   MT: KYLIE      Name:     JIAN WEEMS   MRN:      [id number]        Account:        [id number]   :      1950           Procedure Date: 06/15/2017      Document: [id number]       cc: Gomez Arceo MD

## 2017-06-15 NOTE — ANESTHESIA POSTPROCEDURE EVALUATION
Patient: Amalia Britton    Procedure(s):  LEFT TOTAL KNEE ARTHROPLASTY (BIOMET)^ - Wound Class: I-Clean    Diagnosis:LEFT KNEE DJD  Diagnosis Additional Information: No value filed.    Anesthesia Type:  General, LMA, Periph. Nerve Block for postop pain    Note:  Anesthesia Post Evaluation    Patient location during evaluation: PACU  Patient participation: Able to fully participate in evaluation  Level of consciousness: awake  Pain management: adequate  Airway patency: patent  Cardiovascular status: acceptable  Respiratory status: acceptable  Hydration status: acceptable  PONV: none     Anesthetic complications: None          Last vitals:  Vitals:    06/15/17 1140 06/15/17 1210 06/15/17 1240   BP:  124/62 101/51   Pulse:      Resp: 12 16 16   Temp:  37.2  C (99  F)    SpO2: 97% 97% 92%         Electronically Signed By: Radha Sosa MD, MD  Tamiko 15, 2017  1:16 PM

## 2017-06-15 NOTE — PROGRESS NOTES
06/15/17 1450   Quick Adds   Type of Visit Initial PT Evaluation   Living Environment   Lives With alone   Living Arrangements house   Home Accessibility tub/shower is not walk in   Number of Stairs to Enter Home 1  (no rails)   Number of Stairs Within Home 12  (one rail)   Transportation Available car   Living Environment Comment Patient reports she does not have any friends or family who can assist during recovery.    Self-Care   Usual Activity Tolerance good   Equipment Currently Used at Home cane, straight   Functional Level Prior   Ambulation 0-->independent   Transferring 0-->independent   Toileting 0-->independent   Bathing 0-->independent   Dressing 0-->independent   Fall history within last six months yes   Number of times patient has fallen within last six months 6   Which of the above functional risks had a recent onset or change? ambulation;transferring   Prior Functional Level Comment Patient reports independence with mobility/ADL's prior to admission.    General Information   Onset of Illness/Injury or Date of Surgery - Date 06/15/17   Referring Physician MD Dalton   Patient/Family Goals Statement To go to rehab   Pertinent History of Current Problem (include personal factors and/or comorbidities that impact the POC) 66 year old female s/p L TKA on 6/15/17. PMH significant for anxiety.    Precautions/Limitations fall precautions   Weight-Bearing Status - LLE weight-bearing as tolerated   General Info Comments Ambulate with assist   Cognitive Status Examination   Orientation orientation to person, place and time   Level of Consciousness alert;lethargic/somnolent   Follows Commands and Answers Questions able to follow single-step instructions   Pain Assessment   Patient Currently in Pain (Reports 4/10 pain the L LE)   Integumentary/Edema   Integumentary/Edema Comments Dressing to the L LE clean, dry and intact   Range of Motion (ROM)   ROM Comment Decreased L knee AROM secondary to pain and weakness  "  Strength   Strength Comments Poor quad set on the L LE.    Transfer Skills   Transfer Comments Mobility not assessed this session. Patient lethargic and needing assist to complete supine exercises.    Sensory Examination   Sensory Perception Comments Denies burning, numbness and tingling.    Modality Interventions   Planned Modality Interventions Cryotherapy   Planned Modality Interventions Comments PRN   General Therapy Interventions   Planned Therapy Interventions bed mobility training;gait training;ROM;strengthening;transfer training;home program guidelines;progressive activity/exercise   Clinical Impression   Criteria for Skilled Therapeutic Intervention yes, treatment indicated   PT Diagnosis Impaired gait   Influenced by the following impairments Pain, decreased L LE ROM/strength, impaired balance   Functional limitations due to impairments Decreased independence with functional mobility   Clinical Presentation Evolving/Changing   Clinical Presentation Rationale Waxing/waning alterness this session   Clinical Decision Making (Complexity) Low complexity   Therapy Frequency` 2 times/day   Predicted Duration of Therapy Intervention (days/wks) 4 days   Anticipated Equipment Needs at Discharge front wheeled walker   Anticipated Discharge Disposition Transitional Care Facility   Risk & Benefits of therapy have been explained Yes   Patient, Family & other staff in agreement with plan of care Yes   Clinical Impression Comments Patient appropriate for continued acute care PT to address strength/ROM deficits in order to maximize independence with functional mobility prior to discharge,   [facility]-PAC TM \"6 Clicks\"   2016, Trustees of [facility], under license to [employer].  All rights reserved.   6 Clicks Short Forms Basic Mobility Inpatient Short Form   [facility] AM-PAC  \"6 Clicks\" V.2 Basic Mobility Inpatient Short Form   1. Turning from your back to your side while in a flat bed " without using bedrails? 2 - A Lot   2. Moving from lying on your back to sitting on the side of a flat bed without using bedrails? 2 - A Lot   3. Moving to and from a bed to a chair (including a wheelchair)? 1 - Total   4. Standing up from a chair using your arms (e.g., wheelchair, or bedside chair)? 1 - Total   5. To walk in hospital room? 1 - Total   6. Climbing 3-5 steps with a railing? 1 - Total   Basic Mobility Raw Score (Score out of 24.Lower scores equate to lower levels of function) 8   Total Evaluation Time   Total Evaluation Time (Minutes) 10

## 2017-06-16 ENCOUNTER — APPOINTMENT (OUTPATIENT)
Dept: PHYSICAL THERAPY | Facility: CLINIC | Age: 67
DRG: 470 | End: 2017-06-16
Attending: ORTHOPAEDIC SURGERY
Payer: MEDICARE

## 2017-06-16 LAB — HGB BLD-MCNC: 9.6 G/DL (ref 11.7–15.7)

## 2017-06-16 PROCEDURE — 25000132 ZZH RX MED GY IP 250 OP 250 PS 637: Mod: GY | Performed by: ORTHOPAEDIC SURGERY

## 2017-06-16 PROCEDURE — 97530 THERAPEUTIC ACTIVITIES: CPT | Mod: GP | Performed by: PHYSICAL THERAPIST

## 2017-06-16 PROCEDURE — 40000193 ZZH STATISTIC PT WARD VISIT: Performed by: PHYSICAL THERAPIST

## 2017-06-16 PROCEDURE — 85018 HEMOGLOBIN: CPT | Performed by: ORTHOPAEDIC SURGERY

## 2017-06-16 PROCEDURE — 25000128 H RX IP 250 OP 636: Performed by: ORTHOPAEDIC SURGERY

## 2017-06-16 PROCEDURE — A9270 NON-COVERED ITEM OR SERVICE: HCPCS | Mod: GY | Performed by: ORTHOPAEDIC SURGERY

## 2017-06-16 PROCEDURE — 12000007 ZZH R&B INTERMEDIATE

## 2017-06-16 PROCEDURE — 36415 COLL VENOUS BLD VENIPUNCTURE: CPT | Performed by: ORTHOPAEDIC SURGERY

## 2017-06-16 PROCEDURE — 97110 THERAPEUTIC EXERCISES: CPT | Mod: GP | Performed by: PHYSICAL THERAPIST

## 2017-06-16 RX ADMIN — DIAZEPAM 10 MG: 5 TABLET ORAL at 13:48

## 2017-06-16 RX ADMIN — OXYCODONE HYDROCHLORIDE 10 MG: 10 TABLET, FILM COATED, EXTENDED RELEASE ORAL at 06:02

## 2017-06-16 RX ADMIN — CELECOXIB 200 MG: 200 CAPSULE ORAL at 08:06

## 2017-06-16 RX ADMIN — OXYCODONE HYDROCHLORIDE 10 MG: 10 TABLET, FILM COATED, EXTENDED RELEASE ORAL at 18:13

## 2017-06-16 RX ADMIN — DIAZEPAM 5 MG: 5 TABLET ORAL at 00:55

## 2017-06-16 RX ADMIN — OXYCODONE HYDROCHLORIDE 10 MG: 5 TABLET ORAL at 16:46

## 2017-06-16 RX ADMIN — OXYCODONE HYDROCHLORIDE 10 MG: 5 TABLET ORAL at 06:02

## 2017-06-16 RX ADMIN — SENNOSIDES AND DOCUSATE SODIUM 1 TABLET: 8.6; 5 TABLET ORAL at 08:05

## 2017-06-16 RX ADMIN — HYDROMORPHONE HYDROCHLORIDE 0.5 MG: 1 INJECTION, SOLUTION INTRAMUSCULAR; INTRAVENOUS; SUBCUTANEOUS at 00:55

## 2017-06-16 RX ADMIN — OXYCODONE HYDROCHLORIDE 10 MG: 5 TABLET ORAL at 12:12

## 2017-06-16 RX ADMIN — GABAPENTIN 600 MG: 600 TABLET, FILM COATED ORAL at 21:13

## 2017-06-16 RX ADMIN — OXYCODONE HYDROCHLORIDE 10 MG: 5 TABLET ORAL at 03:01

## 2017-06-16 RX ADMIN — GABAPENTIN 600 MG: 600 TABLET, FILM COATED ORAL at 16:46

## 2017-06-16 RX ADMIN — SENNOSIDES AND DOCUSATE SODIUM 1 TABLET: 8.6; 5 TABLET ORAL at 21:13

## 2017-06-16 RX ADMIN — TRAZODONE HYDROCHLORIDE 300 MG: 100 TABLET ORAL at 00:55

## 2017-06-16 RX ADMIN — ENOXAPARIN SODIUM 40 MG: 40 INJECTION SUBCUTANEOUS at 07:52

## 2017-06-16 RX ADMIN — CELECOXIB 200 MG: 200 CAPSULE ORAL at 18:13

## 2017-06-16 RX ADMIN — DIAZEPAM 10 MG: 5 TABLET ORAL at 21:13

## 2017-06-16 RX ADMIN — HYDROMORPHONE HYDROCHLORIDE 0.5 MG: 1 INJECTION, SOLUTION INTRAMUSCULAR; INTRAVENOUS; SUBCUTANEOUS at 13:58

## 2017-06-16 RX ADMIN — ACETAMINOPHEN 975 MG: 325 TABLET, FILM COATED ORAL at 16:46

## 2017-06-16 RX ADMIN — OXYCODONE HYDROCHLORIDE 10 MG: 5 TABLET ORAL at 09:09

## 2017-06-16 RX ADMIN — HYDROMORPHONE HYDROCHLORIDE 0.5 MG: 1 INJECTION, SOLUTION INTRAMUSCULAR; INTRAVENOUS; SUBCUTANEOUS at 18:31

## 2017-06-16 RX ADMIN — SERTRALINE HYDROCHLORIDE 150 MG: 100 TABLET ORAL at 08:05

## 2017-06-16 RX ADMIN — LEVOTHYROXINE SODIUM 112 MCG: 112 TABLET ORAL at 21:13

## 2017-06-16 RX ADMIN — LAMOTRIGINE 100 MG: 100 TABLET ORAL at 21:13

## 2017-06-16 RX ADMIN — DIAZEPAM 10 MG: 5 TABLET ORAL at 07:53

## 2017-06-16 RX ADMIN — LAMOTRIGINE 100 MG: 100 TABLET ORAL at 08:06

## 2017-06-16 RX ADMIN — ACETAMINOPHEN 975 MG: 325 TABLET, FILM COATED ORAL at 07:53

## 2017-06-16 RX ADMIN — VALACYCLOVIR HYDROCHLORIDE 1000 MG: 1 TABLET, FILM COATED ORAL at 08:06

## 2017-06-16 RX ADMIN — GABAPENTIN 600 MG: 600 TABLET, FILM COATED ORAL at 08:06

## 2017-06-16 NOTE — PROGRESS NOTES
"Amalia THOMAS Rotar  2017  POD # 1    Doing well.  No immediate surgical complications identified.  No excessive bleeding  Sitting up eating  Appears comfortable  Objective:  Blood pressure 101/48, pulse 72, temperature 98.8  F (37.1  C), temperature source Oral, resp. rate 14, height 1.6 m (5' 3\"), weight 70.3 kg (155 lb 1 oz), SpO2 98 %.    Temperatures:  Current - Temp: 98.8  F (37.1  C); Max - Temp  Av.5  F (36.9  C)  Min: 97.8  F (36.6  C)  Max: 99  F (37.2  C)  Pulse range: No Data Recorded  Blood pressure range: Systolic (24hrs), Av , Min:94 , Max:172   ; Diastolic (24hrs), Av, Min:44, Max:111    Exam:  CMS: intact  alert, stable, dressing dry      Labs:  No results for input(s): POTASSIUM in the last 26116 hours.  Recent Labs   Lab Test  17   0655  06/15/17   0650  04/10/17   1506   HGB  9.6*  11.7  8.2*  Canceled, Test credited  CORRECTED ON 04/10 AT 1700: PREVIOUSLY REPORTED AS Canceled, Test credited   Duplicate request SEE CBC RESULTS [place]       No results for input(s): INR in the last 30771 hours.  Recent Labs   Lab Test  06/15/17   1418  04/10/17   1506   PLT  202  368  Canceled, Test credited       PLAN:  Start physical therapy  Pain control measures      "

## 2017-06-16 NOTE — PROGRESS NOTES
Care Transition Initial Assessment - JUNE  Reason For Consult: discharge planning  Met with: Patient   Active Problems:    Status post total left knee replacement         DATA  Lives With: alone  Living Arrangements: house  Description of Support System: Supportive, Involved  Who is your support system?: Children  Support Assessment: Adequate family and caregiver support, Adequate social supports.   Identified issues/concerns regarding health management:            Quality Of Family Relationships: supportive, involved  Transportation Available: TBD      ASSESSMENT  Cognitive Status:  Awake, alert and oriented X3.  Concerns to be addressed:     Per MD order, SW met with patient to discuss discharge planning needs.  Patient is a 66-year-old female who was admitted to the hospital on 6-15-17 for an elective left TKA.  Prior to hospitalization, patient was living alone in her own home where she was managing well.  Patient is aware that she will need to go to Rehab prior to returning home and SW spoke to Dr. Hoffman who is in agreement with this plan.  Patient expressed interest in [facility] and is requesting a private room there.  She is aware of the $40 per day fee for the private room that is not covered under her insurance.  SW made a referral to the facility via Discharge on the Double to have them assess patient for a possible admission on Sunday.  SW will follow up regarding transportation once the discharge plan has been finalized.     PLAN  Financial costs for the patient includes: Discussed the $40 per day private room fee that is not covered under her insurance.  Patient given options and choices for discharge: TCU facility list offered.  Patient/family is agreeable to the plan?  Yes  Patient Goals and Preferences: TCU at discharge.  Patient anticipates discharging to: TCU at discharge.  Discharge Planner   Discharge Plans in progress: Referral made to TCU for Sunday.  Barriers to discharge plan:  None  Follow up plan: Will follow up regarding bed availability for Sunday.       Entered by: Yanna Colin 06/16/2017 11:12 AM     FRANKLYN Kaur      JUNE  D: JUNE following for discharge planning needs.  JUNE received a message from Matt in Admissions at [facility] stating that they would have a bed available for patient on Sunday.  I: SW updated patient on the above and discussed transportation to get to Rehab.  Patient confirmed that family will transport her on Sunday at around 1230.  SW spoke to Matt to confirm the bed and to update her on the transport time for Sunday.  SW on Sunday to fax the discharge orders to [phone] and should direct questions or changes to the Admissions Office at [phone].  A: Patient is alert and oriented X3.  P: SW will continue to follow and assist with finalizing the discharge plan as appropriate.    FRANKLYN Kaur      PAS-RR    D: Per DHS regulation, JUNE completed and submitted PAS-RR to [place] [employer] Direct Connect via the Senior LinkAge Line.  PAS-RR confirmation # is : [id number].    I: SW spoke with patient and she is aware a PAS-RR has been submitted.  JUNE reviewed with patient that she may be contacted for a follow up appointment within 10 days of hospital discharge if their SNF stay is < 30 days.  Contact information for [facility] was also provided.    A: Patient verbalized understanding.    P: Further questions may be directed to [facility] LinkAge Line at #[phone], option #4 for PAS-RR staff.    FRANKLYN Kaur

## 2017-06-16 NOTE — PLAN OF CARE
Problem: Goal Outcome Summary  Goal: Goal Outcome Summary  Outcome: Improving  Patient A&Ox4 but  Anxious & tearful sometimes. VSS On 2LPM oxygen. Dressing  C/d/I. Iv infusing. On Regular diet. Immobilizer On. PCD on all night. Pain managed with Oxycodone, Tylenol, Valium & OxyContin. Hemovac patent. D/c imelda now DTV. Encouraged IS . Will continue monitoring.

## 2017-06-16 NOTE — PROGRESS NOTES
SPIRITUAL HEALTH SERVICES Progress Note  [facility]    PRIMARY FOCUS:      Emotional/spiritual/Religion distress    Support for coping    ILLNESS CIRCUMSTANCES:    Reviewed documentation. Reflective conversation shared with Amalia which integrated elements of illness and family narratives.         Context of Serious Illness/Symptom(s) -  Pt recovering from knee replacement    Resources for Support - Pt unable to identify sources of support      DISTRESS:      Emotional/Existential/Relational Distress - Pt's spouse  on .  She has a framed picture of him with her.  Pt says that she expected she might be shaken with grief due to this procedure, and it is still very difficult for her.  She cries on and off throughout our visit, telling the full story of her 's diagnosis of Stage IV cancer, his decline, his insistence in not telling others, and his dying process.  She talks of many other topics, as well, allowing SH little room to interject.  Additionally, pt speaks of strained relationships with her own son and her  's children.  Pt's medical difficulties have brought the absence of her  to roche reality as he was always the person who would take care of her.    Spiritual/Jew Distress - Her grief spills into her spiritual being, as well, coming out as technical questions around the meaning of Synagogue.  Additionally, she does not have a spiritual home here in [place].    Social/Cultural/Economic Distress - Pt expresses stress due to having so much to do around her 's death, from liquidating his business assets to planning his memorial service for mid-July.  She says that she cannot take the time to fall apart because there is so much to be done.       SPIRITUAL/Oriental orthodox (Coping):      Restorationist/Christa - Pt raised [place] in a Orthodoxy neighborhood.  She and her  had a meaningful Faith community in [place] where they wintered for about five years.    Spiritual  "Practice(s) - Prayer, listening to an audio version of the Bible, attending Bible Study.Emotional/Existential/ Relationall/Connections - Pt expresses feeling isolated due to strained family relationships.  Bible Study with a group of women has been very meaningful for her, and while she speaks fondly of these women as \"sisters in [place],\" she says that they are busy and she could never ask them to help her.      GOALS OF CARE:    Goals of Care - Pt anticipates healing and resuming normal activities after Rehab.    Meaning/Sense-Making - Pt hopes to find her new mission, as she believes that caring for her  through his illness and death was her God-intended purpose.  As her life continues, she is hoping for purpose.      PLAN:  provided emotional and spiritual support as well as prayer.   will notify Saturday and Sunday chaplains that pt might have further need.  [facility] team remains available upon request.                                                                                                                 Rosaura Guevara M.Div.  Chaplain Resident  Pager [phone]  "

## 2017-06-17 ENCOUNTER — APPOINTMENT (OUTPATIENT)
Dept: PHYSICAL THERAPY | Facility: CLINIC | Age: 67
DRG: 470 | End: 2017-06-17
Attending: ORTHOPAEDIC SURGERY
Payer: MEDICARE

## 2017-06-17 LAB
GLUCOSE SERPL-MCNC: 95 MG/DL (ref 70–99)
HGB BLD-MCNC: 9.5 G/DL (ref 11.7–15.7)

## 2017-06-17 PROCEDURE — 97116 GAIT TRAINING THERAPY: CPT | Mod: GP

## 2017-06-17 PROCEDURE — A9270 NON-COVERED ITEM OR SERVICE: HCPCS | Mod: GY | Performed by: ORTHOPAEDIC SURGERY

## 2017-06-17 PROCEDURE — 85018 HEMOGLOBIN: CPT | Performed by: ORTHOPAEDIC SURGERY

## 2017-06-17 PROCEDURE — 40000193 ZZH STATISTIC PT WARD VISIT

## 2017-06-17 PROCEDURE — 97530 THERAPEUTIC ACTIVITIES: CPT | Mod: GP | Performed by: PHYSICAL THERAPIST

## 2017-06-17 PROCEDURE — 25000132 ZZH RX MED GY IP 250 OP 250 PS 637: Mod: GY | Performed by: ORTHOPAEDIC SURGERY

## 2017-06-17 PROCEDURE — 82947 ASSAY GLUCOSE BLOOD QUANT: CPT | Performed by: ORTHOPAEDIC SURGERY

## 2017-06-17 PROCEDURE — 40000894 ZZH STATISTIC OT IP EVAL DEFER: Performed by: OCCUPATIONAL THERAPIST

## 2017-06-17 PROCEDURE — 97110 THERAPEUTIC EXERCISES: CPT | Mod: GP

## 2017-06-17 PROCEDURE — 25000128 H RX IP 250 OP 636: Performed by: ORTHOPAEDIC SURGERY

## 2017-06-17 PROCEDURE — 12000007 ZZH R&B INTERMEDIATE

## 2017-06-17 PROCEDURE — 36415 COLL VENOUS BLD VENIPUNCTURE: CPT | Performed by: ORTHOPAEDIC SURGERY

## 2017-06-17 PROCEDURE — 97110 THERAPEUTIC EXERCISES: CPT | Mod: GP | Performed by: PHYSICAL THERAPIST

## 2017-06-17 PROCEDURE — 40000193 ZZH STATISTIC PT WARD VISIT: Performed by: PHYSICAL THERAPIST

## 2017-06-17 RX ORDER — OXYCODONE HYDROCHLORIDE 5 MG/1
5-10 TABLET ORAL
Qty: 60 TABLET | Refills: 0 | Status: SHIPPED | DISCHARGE
Start: 2017-06-17 | End: 2021-10-13

## 2017-06-17 RX ORDER — AMOXICILLIN 250 MG
1-2 CAPSULE ORAL 2 TIMES DAILY
Qty: 100 TABLET | Refills: 0 | DISCHARGE
Start: 2017-06-17 | End: 2021-10-13

## 2017-06-17 RX ORDER — OXYCODONE HCL 10 MG/1
10 TABLET, FILM COATED, EXTENDED RELEASE ORAL EVERY 12 HOURS
Refills: 0 | Status: SHIPPED | DISCHARGE
Start: 2017-06-17 | End: 2021-10-13

## 2017-06-17 RX ORDER — ASPIRIN 325 MG
325 TABLET ORAL 2 TIMES DAILY
Qty: 90 TABLET | Refills: 0 | DISCHARGE
Start: 2017-06-17 | End: 2021-10-13

## 2017-06-17 RX ORDER — CELECOXIB 200 MG/1
200 CAPSULE ORAL DAILY
Qty: 30 CAPSULE | DISCHARGE
Start: 2017-06-18 | End: 2021-10-13

## 2017-06-17 RX ADMIN — CELECOXIB 200 MG: 200 CAPSULE ORAL at 08:01

## 2017-06-17 RX ADMIN — DIAZEPAM 10 MG: 5 TABLET ORAL at 15:48

## 2017-06-17 RX ADMIN — GABAPENTIN 600 MG: 600 TABLET, FILM COATED ORAL at 15:48

## 2017-06-17 RX ADMIN — DIAZEPAM 10 MG: 5 TABLET ORAL at 08:11

## 2017-06-17 RX ADMIN — OXYCODONE HYDROCHLORIDE 10 MG: 5 TABLET ORAL at 21:37

## 2017-06-17 RX ADMIN — LAMOTRIGINE 100 MG: 100 TABLET ORAL at 08:01

## 2017-06-17 RX ADMIN — TRAZODONE HYDROCHLORIDE 300 MG: 100 TABLET ORAL at 00:14

## 2017-06-17 RX ADMIN — OXYCODONE HYDROCHLORIDE 10 MG: 5 TABLET ORAL at 00:17

## 2017-06-17 RX ADMIN — ACETAMINOPHEN 975 MG: 325 TABLET, FILM COATED ORAL at 00:14

## 2017-06-17 RX ADMIN — ACETAMINOPHEN 975 MG: 325 TABLET, FILM COATED ORAL at 08:01

## 2017-06-17 RX ADMIN — DIAZEPAM 10 MG: 5 TABLET ORAL at 21:37

## 2017-06-17 RX ADMIN — OXYCODONE HYDROCHLORIDE 10 MG: 10 TABLET, FILM COATED, EXTENDED RELEASE ORAL at 06:41

## 2017-06-17 RX ADMIN — VALACYCLOVIR HYDROCHLORIDE 1000 MG: 1 TABLET, FILM COATED ORAL at 08:02

## 2017-06-17 RX ADMIN — SENNOSIDES AND DOCUSATE SODIUM 1 TABLET: 8.6; 5 TABLET ORAL at 08:02

## 2017-06-17 RX ADMIN — GABAPENTIN 600 MG: 600 TABLET, FILM COATED ORAL at 21:37

## 2017-06-17 RX ADMIN — LEVOTHYROXINE SODIUM 112 MCG: 112 TABLET ORAL at 21:37

## 2017-06-17 RX ADMIN — OXYCODONE HYDROCHLORIDE 10 MG: 5 TABLET ORAL at 08:11

## 2017-06-17 RX ADMIN — OXYCODONE HYDROCHLORIDE 10 MG: 10 TABLET, FILM COATED, EXTENDED RELEASE ORAL at 18:52

## 2017-06-17 RX ADMIN — LAMOTRIGINE 100 MG: 100 TABLET ORAL at 20:22

## 2017-06-17 RX ADMIN — SERTRALINE HYDROCHLORIDE 150 MG: 100 TABLET ORAL at 08:02

## 2017-06-17 RX ADMIN — ACETAMINOPHEN 975 MG: 325 TABLET, FILM COATED ORAL at 15:48

## 2017-06-17 RX ADMIN — GABAPENTIN 600 MG: 600 TABLET, FILM COATED ORAL at 08:02

## 2017-06-17 RX ADMIN — OXYCODONE HYDROCHLORIDE 10 MG: 5 TABLET ORAL at 15:48

## 2017-06-17 RX ADMIN — SENNOSIDES AND DOCUSATE SODIUM 2 TABLET: 8.6; 5 TABLET ORAL at 20:22

## 2017-06-17 RX ADMIN — ENOXAPARIN SODIUM 40 MG: 40 INJECTION SUBCUTANEOUS at 08:01

## 2017-06-17 NOTE — PLAN OF CARE
Problem: Goal Outcome Summary  Goal: Goal Outcome Summary  OT: Following chart review and consult with PT staff, OT eval deferred to next level of care, pt to discharge to TCU, needs met with PT at this time. Orders completed

## 2017-06-17 NOTE — PLAN OF CARE
"Problem: Goal Outcome Summary  Goal: Goal Outcome Summary  Discharge Planner PT   Patient plan for discharge: \"Go to rehab.\"   Surgeon plan: Not documented.  Current status: PT: Needs modA and vc for exercise, bed mobility, transfers, and gait training, WBAT L with FWW and knee immobilizer 2' from EOB to w/c.  L knee ROM: 7-64 degrees.  Barriers to return to prior living situation: Postop pain, edema, weakness, and lives alone in house with 1 step to enter, and 12 steps within.  Recommendations for discharge: TBD POD#2.  Rationale for recommendations: Functional independence for negotiation of chosen residence, and level of assistance.        Entered by: Jayden Parekh 06/17/2017 12:12 AM         "

## 2017-06-17 NOTE — PLAN OF CARE
Problem: Goal Outcome Summary  Goal: Goal Outcome Summary  Discharge Planner PT   Patient plan for discharge: TCU  Current status: Pt performs sit/stand with SBA at FWW and  cues for hand placement. Pt transfers sit/supine with SBA AA using UE to lift Left LE.Pt performs gait training with FWW CGA 6 feet x 1 with education on wt bearing thru UEs more, equal stride and upright posture. Pt AAROM left knee 8-88. Pt reports pain 7/10.  Barriers to return to prior living situation: weakness, and lives alone in house with 1 step to enter, and 12 steps within.  Recommendations for discharge: TBD as noted by PT   Rationale for recommendations: Functional independence for negotiation of chosen residence, and level of assistance       Entered by: Hellen Norton 06/17/2017 9:03 AM

## 2017-06-17 NOTE — PLAN OF CARE
Problem: Goal Outcome Summary  Goal: Goal Outcome Summary  Outcome: Improving  Patient is A&O, VSS on RA, CMS intact, regular diet, up with assist of 1. Oxycodone for pain control, Valium for anxiety, voiding adequately using bedside commode. Dressing has some drainage on it, knee immobilizer at night. Will continue to monitor.

## 2017-06-17 NOTE — PROGRESS NOTES
"Amalia THOMSA Rotar  2017  POD # 2    Doing well.  No immediate surgical complications identified.  No excessive bleeding  Pain well-controlled.  Tolerating physical therapy and rehabilitation well.  Objective:  Blood pressure 95/50, pulse 72, temperature 98.6  F (37  C), temperature source Oral, resp. rate 16, height 1.6 m (5' 3\"), weight 70.3 kg (155 lb 1 oz), SpO2 (!) 89 %.    Temperatures:  Current - Temp: 98.6  F (37  C); Max - Temp  Av.8  F (37.1  C)  Min: 97.7  F (36.5  C)  Max: 99.8  F (37.7  C)  Pulse range: No Data Recorded  Blood pressure range: Systolic (24hrs), Av , Min:86 , Max:137   ; Diastolic (24hrs), Av, Min:48, Max:65    Exam:  CMS: intact  alert, stable, dressing dry      Labs:  No results for input(s): POTASSIUM in the last 57464 hours.  Recent Labs   Lab Test  17   0650  17   0655  06/15/17   0650   HGB  9.5*  9.6*  11.7     No results for input(s): INR in the last 81249 hours.  Recent Labs   Lab Test  06/15/17   1418  04/10/17   1506   PLT  202  368  Canceled, Test credited       PLAN:  Continue physical therapy  Pain control measures      "

## 2017-06-17 NOTE — PLAN OF CARE
Problem: Knee Replacement, Total (Adult)  Goal: Signs and Symptoms of Listed Potential Problems Will be Absent or Manageable (Knee Replacement, Total)  Signs and symptoms of listed potential problems will be absent or manageable by discharge/transition of care (reference Knee Replacement, Total (Adult) CPG).   Outcome: Improving  A&O but forgetful. Up w/1 and walker. Pain 8/10; PRN Oxy and Valium. Calls appropriately.  Voids in BSC. Regular diet.

## 2017-06-17 NOTE — PLAN OF CARE
Problem: Goal Outcome Summary  Goal: Goal Outcome Summary  Outcome: Improving  A&O X4. Pt frequently tearful over shift (pts spouse recently passed away), pt visited with  today. CMS intact. Drg CDI. hmv removed. Pain is being managed with po and iv pain meds. A-1 with walker. Eating, drinking, and voiding adequately. Pt's BP has been soft, otherwise VSS on RA. Denies dizzyness/lightheadedness. Progressing per POC.

## 2017-06-18 VITALS
TEMPERATURE: 98.5 F | HEIGHT: 63 IN | SYSTOLIC BLOOD PRESSURE: 107 MMHG | RESPIRATION RATE: 16 BRPM | OXYGEN SATURATION: 95 % | DIASTOLIC BLOOD PRESSURE: 59 MMHG | HEART RATE: 72 BPM | BODY MASS INDEX: 27.47 KG/M2 | WEIGHT: 155.06 LBS

## 2017-06-18 LAB
CREAT SERPL-MCNC: 0.54 MG/DL (ref 0.52–1.04)
GFR SERPL CREATININE-BSD FRML MDRD: NORMAL ML/MIN/1.7M2
PLATELET # BLD AUTO: 184 10E9/L (ref 150–450)

## 2017-06-18 PROCEDURE — 25000132 ZZH RX MED GY IP 250 OP 250 PS 637: Mod: GY | Performed by: ORTHOPAEDIC SURGERY

## 2017-06-18 PROCEDURE — 82565 ASSAY OF CREATININE: CPT | Performed by: ORTHOPAEDIC SURGERY

## 2017-06-18 PROCEDURE — 85049 AUTOMATED PLATELET COUNT: CPT | Performed by: ORTHOPAEDIC SURGERY

## 2017-06-18 PROCEDURE — 25000128 H RX IP 250 OP 636: Performed by: ORTHOPAEDIC SURGERY

## 2017-06-18 PROCEDURE — 36415 COLL VENOUS BLD VENIPUNCTURE: CPT | Performed by: ORTHOPAEDIC SURGERY

## 2017-06-18 PROCEDURE — A9270 NON-COVERED ITEM OR SERVICE: HCPCS | Mod: GY | Performed by: ORTHOPAEDIC SURGERY

## 2017-06-18 RX ADMIN — VALACYCLOVIR HYDROCHLORIDE 1000 MG: 1 TABLET, FILM COATED ORAL at 08:24

## 2017-06-18 RX ADMIN — ACETAMINOPHEN 975 MG: 325 TABLET, FILM COATED ORAL at 00:44

## 2017-06-18 RX ADMIN — ACETAMINOPHEN 975 MG: 325 TABLET, FILM COATED ORAL at 08:23

## 2017-06-18 RX ADMIN — GABAPENTIN 600 MG: 600 TABLET, FILM COATED ORAL at 08:24

## 2017-06-18 RX ADMIN — OXYCODONE HYDROCHLORIDE 10 MG: 10 TABLET, FILM COATED, EXTENDED RELEASE ORAL at 06:29

## 2017-06-18 RX ADMIN — LAMOTRIGINE 100 MG: 100 TABLET ORAL at 08:24

## 2017-06-18 RX ADMIN — SERTRALINE HYDROCHLORIDE 150 MG: 100 TABLET ORAL at 08:24

## 2017-06-18 RX ADMIN — SENNOSIDES AND DOCUSATE SODIUM 2 TABLET: 8.6; 5 TABLET ORAL at 08:24

## 2017-06-18 RX ADMIN — DIAZEPAM 5 MG: 5 TABLET ORAL at 06:31

## 2017-06-18 RX ADMIN — DIAZEPAM 5 MG: 5 TABLET ORAL at 12:11

## 2017-06-18 RX ADMIN — CELECOXIB 200 MG: 200 CAPSULE ORAL at 08:24

## 2017-06-18 RX ADMIN — ENOXAPARIN SODIUM 40 MG: 40 INJECTION SUBCUTANEOUS at 08:23

## 2017-06-18 RX ADMIN — TRAZODONE HYDROCHLORIDE 300 MG: 100 TABLET ORAL at 00:44

## 2017-06-18 RX ADMIN — OXYCODONE HYDROCHLORIDE 10 MG: 5 TABLET ORAL at 12:11

## 2017-06-18 NOTE — PLAN OF CARE
Problem: Goal Outcome Summary  Goal: Goal Outcome Summary  Discharge Planner PT   Patient plan for discharge: TCU  Current status: Pt declined intervention this day, has been ambulating with SBA and use of WW. Transfers with SBA. Knee AROM up to 88 degrees of flexion.  Barriers to return to prior living situation: Stairs to enter/exit, stairs within the home. Lives alone.  Recommendations for discharge: TCU  Rationale for recommendations: Lives alone, multiple stairs. Will benefit from skilled PT intervention to improve safety, independence, confidence, strength and ROM for optimal functional mobility.       Entered by: Cyndie Bishop 06/18/2017 11:01 AM        Physical Therapy Discharge Summary     Reason for therapy discharge:    Discharged to transitional care facility.     Progress towards therapy goal(s). See goals on Care Plan in [facility] electronic health record for goal details.  Goals partially met.  Barriers to achieving goals:   All goals with progress.     Therapy recommendation(s):    Continued therapy is recommended.  Rationale/Recommendations:  Pt will benefit from continued skilled PT interevention to increase independence and safety with functional mobility..

## 2017-06-18 NOTE — PLAN OF CARE
Problem: Goal Outcome Summary  Goal: Goal Outcome Summary  Outcome: Improving  Patient is A&O, VSS on RA, CMS intact, regular diet, up with assist of 1. Voiding adequately using BSC, Oxycodone and Valium for pain, dressing CDI. Plan is to discharge to TCU today. Will continue to monitor

## 2017-06-18 NOTE — PLAN OF CARE
Problem: Goal Outcome Summary  Goal: Goal Outcome Summary  Alert and oriented x 4, forgetful at times. Up with assist of 1. Voids in bsc. CMS intact. Dressing changed. Incision WNL

## 2017-06-18 NOTE — PROGRESS NOTES
SW:  D:  Received discharge orders for patient.  Bed available at [facility] for today.  Patient's family will transport around 12:30 today.  Patient informed of the plan and in agreement to the plan.  Updated [facility] and faxed the orders.

## 2017-06-18 NOTE — PLAN OF CARE
Problem: Goal Outcome Summary  Goal: Goal Outcome Summary  Outcome: Adequate for Discharge Date Met:  06/18/17  Patient discharged to TCU with family . discharge paper work sent with patient . Pain managed well at time of discharge .

## 2017-06-18 NOTE — DISCHARGE INSTRUCTIONS
Patient will discharge to [facility] today via family around 12:30.  [facility] phone number is [phone].

## 2017-06-18 NOTE — PROGRESS NOTES
POD # 3  S/P Left TKA    Patient comfortable.  Pain controlled.  Tolerating oral intake.  No events overnight.     Alert and orient to person, place, and time.  Vital Sign Ranges  Temperature Temp  Av.5  F (36.9  C)  Min: 98  F (36.7  C)  Max: 98.9  F (37.2  C)   Blood pressure Systolic (24hrs), Av , Min:79 , Max:113        Diastolic (24hrs), Av, Min:36, Max:62      Pulse No Data Recorded   Respirations Resp  Av  Min: 16  Max: 16   Pulse oximetry SpO2  Av.8 %  Min: 89 %  Max: 94 %       Dressing is clean, dry, and intact.  Bilateral calves are soft, non-tender.  Left lower extremity is NVI.    A/P  1. S/P Left TKA   Continue ASA for DVT prophylaxis.  Mobilize with PT/OT WBAT.  Continue current pain regiment.    2. Disposition   Anticipate d/c to home today.    Erin Menjivar   [phone]    [facility]  [phone]

## 2017-06-18 NOTE — DISCHARGE SUMMARY
DATE OF ADMISSION:  06/15/2017      DATE OF DISCHARGE:  2017      ADMITTING DIAGNOSIS:  Left knee osteoarthrosis.      PROCEDURES THIS ADMISSION:  Left total knee arthroplasty.      HOSPITAL COURSE:  Ms. Jian Weems was admitted.  She underwent the above-noted procedure.  She tolerated it well.  Postoperatively, she mobilized, stabilized, is ready for discharge to the transitional care facility.      DISCHARGE MEDICATIONS AS WELL AS ADMISSION:     1.  OxyContin 10 mg p.o. b.i.d. for 3 days then at bedtime for 4 days.   2.  Oxycodone 5-10 mg p.o. q.3 h. p.r.n. pain.   3.  Senokot-S for stool softening.   4.  Aspirin 325 mg p.o. b.i.d. for DVT prophylaxis.   5.  Celebrex 200 mg p.o. daily for inflammation/pain.      DISCHARGE ACTIVITIES:  Weightbear as tolerated left lower extremity.  Participate in routine physical therapy and occupational therapy.      DISCHARGE FOLLOWUP:  She should have a daily dry dressing change until her wound is dry.  Ice to the left knee as much as possible.  She should wear a knee immobilizer at bedtime for 7 days after discharge.  Michael olivares per routine.  Followup Dr. Arceo 2 weeks postop.         GOMEZ ARCEO MD             D: 2017 11:52   T: 2017 21:53   MT: VICTOR HUGO      Name:     JIAN WEEMS   MRN:      [id number]        Account:        [id number]   :      1950           Admit Date:     [id number]                                  Discharge Date:       Document: [id number]

## 2018-07-30 ENCOUNTER — TRANSFERRED RECORDS (OUTPATIENT)
Dept: PHYSICAL THERAPY | Facility: CLINIC | Age: 68
End: 2018-07-30

## 2018-08-07 ENCOUNTER — TRANSFERRED RECORDS (OUTPATIENT)
Dept: PHYSICAL THERAPY | Facility: CLINIC | Age: 68
End: 2018-08-07

## 2018-09-14 ENCOUNTER — TRANSFERRED RECORDS (OUTPATIENT)
Dept: PHYSICAL THERAPY | Facility: CLINIC | Age: 68
End: 2018-09-14

## 2021-09-13 DIAGNOSIS — Z11.59 ENCOUNTER FOR SCREENING FOR OTHER VIRAL DISEASES: ICD-10-CM

## 2021-09-27 ENCOUNTER — TRANSFERRED RECORDS (OUTPATIENT)
Dept: HEALTH INFORMATION MANAGEMENT | Facility: CLINIC | Age: 71
End: 2021-09-27

## 2021-09-27 LAB
CREATININE (EXTERNAL): 0.97 MG/DL (ref 0.55–1.02)
GFR ESTIMATED (EXTERNAL): 59 ML/MIN
GFR ESTIMATED (IF AFRICAN AMERICAN) (EXTERNAL): >60 ML/MIN
POTASSIUM (EXTERNAL): 4.3 MMOL/L (ref 3.5–5.1)

## 2021-10-10 ENCOUNTER — LAB (OUTPATIENT)
Dept: URGENT CARE | Facility: URGENT CARE | Age: 71
End: 2021-10-10
Attending: ORTHOPAEDIC SURGERY
Payer: MEDICARE

## 2021-10-10 DIAGNOSIS — Z11.59 ENCOUNTER FOR SCREENING FOR OTHER VIRAL DISEASES: ICD-10-CM

## 2021-10-10 PROCEDURE — U0005 INFEC AGEN DETEC AMPLI PROBE: HCPCS

## 2021-10-10 PROCEDURE — U0003 INFECTIOUS AGENT DETECTION BY NUCLEIC ACID (DNA OR RNA); SEVERE ACUTE RESPIRATORY SYNDROME CORONAVIRUS 2 (SARS-COV-2) (CORONAVIRUS DISEASE [COVID-19]), AMPLIFIED PROBE TECHNIQUE, MAKING USE OF HIGH THROUGHPUT TECHNOLOGIES AS DESCRIBED BY CMS-2020-01-R: HCPCS

## 2021-10-11 LAB — SARS-COV-2 RNA RESP QL NAA+PROBE: NEGATIVE

## 2021-10-13 RX ORDER — TOPIRAMATE 50 MG/1
100 TABLET, FILM COATED ORAL 2 TIMES DAILY
COMMUNITY

## 2021-10-13 RX ORDER — LEVOTHYROXINE SODIUM 150 UG/1
150 TABLET ORAL EVERY MORNING
COMMUNITY

## 2021-10-13 RX ORDER — PANTOPRAZOLE SODIUM 40 MG/1
40 TABLET, DELAYED RELEASE ORAL 2 TIMES DAILY
COMMUNITY

## 2021-10-13 NOTE — PROGRESS NOTES
PTA medications updated by Medication Scribe prior to surgery via phone call with patient (last doses completed by Nurse)     Medication history sources: Patient, Surescripts, H&P and Patient's home med list  In the past week, patient estimated taking medication this percent of the time: Greater than 90%  Adherence assessment: N/A Not Observed    Significant changes made to the medication list:  Patient reports no longer taking the following meds (med scribe removed from PTA med list): Acetaminophen, Aspirin, Epinephrine Pen, Lamotrigine, Glucosamine, Oxycodone, Valacyclovir      Additional medication history information:   None    Medication reconciliation completed by provider prior to medication history? No    Time spent in this activity: 25 minutes    The information provided in this note is only as accurate as the sources available at the time of update(s)      Prior to Admission medications    Medication Sig Last Dose Taking? Auth Provider   levothyroxine (SYNTHROID/LEVOTHROID) 150 MCG tablet Take 150 mcg by mouth every morning  at AM Yes Reported, Patient   multivitamin, therapeutic with minerals (MULTI-VITAMIN) TABS Take 1 tablet by mouth daily 10/13/2021 at AM Yes Reported, Patient   pantoprazole (PROTONIX) 40 MG EC tablet Take 40 mg by mouth 2 times daily  at AM Yes Reported, Patient   sertraline (ZOLOFT) 100 MG tablet Take 200 mg by mouth daily (2 X 100 mg)  at AM Yes Reported, Patient   topiramate (TOPAMAX) 50 MG tablet Take 100 mg by mouth 2 times daily (2 X 50 mg)  at AM Yes Reported, Patient   traZODone (DESYREL) 100 MG tablet Take 200 mg by mouth At Bedtime (2 x 100mg = 200mg) 10/13/2021 at PM Yes Reported, Patient   vitamin B-12 (CYANOCOBALAMIN) 1000 MCG tablet Take 1,000 mcg by mouth every other day  10/12/2021 at AM Yes Reported, Patient   VITAMIN D, CHOLECALCIFEROL, PO Take 2,000 Units by mouth every other day  10/12/2021 at AM Yes Reported, Patient       Medication history completed  by:    Curt Hilton CPhT  Medication [facility]

## 2021-10-14 ENCOUNTER — APPOINTMENT (OUTPATIENT)
Dept: PHYSICAL THERAPY | Facility: CLINIC | Age: 71
End: 2021-10-14
Attending: ORTHOPAEDIC SURGERY
Payer: MEDICARE

## 2021-10-14 ENCOUNTER — ANESTHESIA (OUTPATIENT)
Dept: SURGERY | Facility: CLINIC | Age: 71
End: 2021-10-14
Payer: MEDICARE

## 2021-10-14 ENCOUNTER — APPOINTMENT (OUTPATIENT)
Dept: GENERAL RADIOLOGY | Facility: CLINIC | Age: 71
End: 2021-10-14
Attending: ORTHOPAEDIC SURGERY
Payer: MEDICARE

## 2021-10-14 ENCOUNTER — HOSPITAL ENCOUNTER (OUTPATIENT)
Facility: CLINIC | Age: 71
Discharge: HOME | End: 2021-10-15
Attending: ORTHOPAEDIC SURGERY | Admitting: ORTHOPAEDIC SURGERY
Payer: MEDICARE

## 2021-10-14 ENCOUNTER — ANESTHESIA EVENT (OUTPATIENT)
Dept: SURGERY | Facility: CLINIC | Age: 71
End: 2021-10-14
Payer: MEDICARE

## 2021-10-14 DIAGNOSIS — Z96.651 STATUS POST TOTAL KNEE REPLACEMENT, RIGHT: ICD-10-CM

## 2021-10-14 DIAGNOSIS — Z96.651 HISTORY OF TOTAL RIGHT KNEE REPLACEMENT: Primary | ICD-10-CM

## 2021-10-14 PROCEDURE — 258N000003 HC RX IP 258 OP 636: Performed by: ANESTHESIOLOGY

## 2021-10-14 PROCEDURE — 710N000009 HC RECOVERY PHASE 1, LEVEL 1, PER MIN: Performed by: ORTHOPAEDIC SURGERY

## 2021-10-14 PROCEDURE — 97116 GAIT TRAINING THERAPY: CPT | Mod: GP | Performed by: PHYSICAL THERAPIST

## 2021-10-14 PROCEDURE — 250N000011 HC RX IP 250 OP 636: Performed by: ORTHOPAEDIC SURGERY

## 2021-10-14 PROCEDURE — 250N000013 HC RX MED GY IP 250 OP 250 PS 637: Performed by: ORTHOPAEDIC SURGERY

## 2021-10-14 PROCEDURE — 999N000141 HC STATISTIC PRE-PROCEDURE NURSING ASSESSMENT: Performed by: ORTHOPAEDIC SURGERY

## 2021-10-14 PROCEDURE — 250N000011 HC RX IP 250 OP 636: Performed by: NURSE ANESTHETIST, CERTIFIED REGISTERED

## 2021-10-14 PROCEDURE — 258N000003 HC RX IP 258 OP 636: Performed by: NURSE ANESTHETIST, CERTIFIED REGISTERED

## 2021-10-14 PROCEDURE — 272N000001 HC OR GENERAL SUPPLY STERILE: Performed by: ORTHOPAEDIC SURGERY

## 2021-10-14 PROCEDURE — 250N000011 HC RX IP 250 OP 636: Performed by: ANESTHESIOLOGY

## 2021-10-14 PROCEDURE — 97161 PT EVAL LOW COMPLEX 20 MIN: CPT | Mod: GP | Performed by: PHYSICAL THERAPIST

## 2021-10-14 PROCEDURE — 97110 THERAPEUTIC EXERCISES: CPT | Mod: GP | Performed by: PHYSICAL THERAPIST

## 2021-10-14 PROCEDURE — 250N000009 HC RX 250: Performed by: ORTHOPAEDIC SURGERY

## 2021-10-14 PROCEDURE — 250N000009 HC RX 250: Performed by: ANESTHESIOLOGY

## 2021-10-14 PROCEDURE — 999N000063 XR KNEE PORT RIGHT 1/2 VIEWS: Mod: RT

## 2021-10-14 PROCEDURE — 250N000009 HC RX 250: Performed by: NURSE ANESTHETIST, CERTIFIED REGISTERED

## 2021-10-14 PROCEDURE — 258N000003 HC RX IP 258 OP 636: Performed by: ORTHOPAEDIC SURGERY

## 2021-10-14 PROCEDURE — 360N000077 HC SURGERY LEVEL 4, PER MIN: Performed by: ORTHOPAEDIC SURGERY

## 2021-10-14 PROCEDURE — 278N000051 HC OR IMPLANT GENERAL: Performed by: ORTHOPAEDIC SURGERY

## 2021-10-14 PROCEDURE — C1776 JOINT DEVICE (IMPLANTABLE): HCPCS | Performed by: ORTHOPAEDIC SURGERY

## 2021-10-14 PROCEDURE — 258N000001 HC RX 258: Performed by: ORTHOPAEDIC SURGERY

## 2021-10-14 PROCEDURE — 370N000017 HC ANESTHESIA TECHNICAL FEE, PER MIN: Performed by: ORTHOPAEDIC SURGERY

## 2021-10-14 DEVICE — LEGION PS HIGH FLEX XLPE SZ 3-4 10MM
Type: IMPLANTABLE DEVICE | Site: KNEE | Status: FUNCTIONAL
Brand: LEGION

## 2021-10-14 DEVICE — GENESIS II NON-POROUS TIBIAL                                    BASEPLATE SIZE 3 RIGHT
Type: IMPLANTABLE DEVICE | Site: KNEE | Status: FUNCTIONAL
Brand: GENESIS II

## 2021-10-14 DEVICE — BONE CEMENT RADIOPAQUE SIMPLEX HV FULL DOSE 6194-1-001: Type: IMPLANTABLE DEVICE | Site: KNEE | Status: FUNCTIONAL

## 2021-10-14 DEVICE — GENESIS II OVAL RESURFACING                                    PATELLAR 32MM
Type: IMPLANTABLE DEVICE | Site: KNEE | Status: FUNCTIONAL
Brand: GENESIS II

## 2021-10-14 DEVICE — LEGION POSTERIOR STABILIZED                                    NONPOROUS FEMORAL SIZE 5 RIGHT
Type: IMPLANTABLE DEVICE | Site: KNEE | Status: FUNCTIONAL
Brand: LEGION

## 2021-10-14 RX ORDER — FENTANYL CITRATE 0.05 MG/ML
25 INJECTION, SOLUTION INTRAMUSCULAR; INTRAVENOUS EVERY 5 MIN PRN
Status: DISCONTINUED | OUTPATIENT
Start: 2021-10-14 | End: 2021-10-14 | Stop reason: HOSPADM

## 2021-10-14 RX ORDER — DIPHENHYDRAMINE HCL 12.5MG/5ML
12.5 LIQUID (ML) ORAL EVERY 6 HOURS PRN
Status: DISCONTINUED | OUTPATIENT
Start: 2021-10-14 | End: 2021-10-15 | Stop reason: HOSPADM

## 2021-10-14 RX ORDER — MULTIPLE VITAMINS W/ MINERALS TAB 9MG-400MCG
1 TAB ORAL DAILY
Status: DISCONTINUED | OUTPATIENT
Start: 2021-10-14 | End: 2021-10-15 | Stop reason: HOSPADM

## 2021-10-14 RX ORDER — SODIUM CHLORIDE, SODIUM LACTATE, POTASSIUM CHLORIDE, CALCIUM CHLORIDE 600; 310; 30; 20 MG/100ML; MG/100ML; MG/100ML; MG/100ML
INJECTION, SOLUTION INTRAVENOUS CONTINUOUS
Status: DISCONTINUED | OUTPATIENT
Start: 2021-10-14 | End: 2021-10-14 | Stop reason: HOSPADM

## 2021-10-14 RX ORDER — TRAZODONE HYDROCHLORIDE 100 MG/1
200 TABLET ORAL AT BEDTIME
Status: DISCONTINUED | OUTPATIENT
Start: 2021-10-14 | End: 2021-10-15 | Stop reason: HOSPADM

## 2021-10-14 RX ORDER — BUPIVACAINE HYDROCHLORIDE 7.5 MG/ML
INJECTION, SOLUTION INTRASPINAL
Status: COMPLETED | OUTPATIENT
Start: 2021-10-14 | End: 2021-10-14

## 2021-10-14 RX ORDER — ACETAMINOPHEN 325 MG/1
975 TABLET ORAL EVERY 8 HOURS
Status: DISCONTINUED | OUTPATIENT
Start: 2021-10-14 | End: 2021-10-15 | Stop reason: HOSPADM

## 2021-10-14 RX ORDER — LIDOCAINE 40 MG/G
CREAM TOPICAL
Status: DISCONTINUED | OUTPATIENT
Start: 2021-10-14 | End: 2021-10-15 | Stop reason: HOSPADM

## 2021-10-14 RX ORDER — ONDANSETRON 4 MG/1
4 TABLET, ORALLY DISINTEGRATING ORAL EVERY 30 MIN PRN
Status: DISCONTINUED | OUTPATIENT
Start: 2021-10-14 | End: 2021-10-14 | Stop reason: HOSPADM

## 2021-10-14 RX ORDER — MAGNESIUM HYDROXIDE/ALUMINUM HYDROXICE/SIMETHICONE 120; 1200; 1200 MG/30ML; MG/30ML; MG/30ML
30 SUSPENSION ORAL EVERY 4 HOURS PRN
Status: DISCONTINUED | OUTPATIENT
Start: 2021-10-14 | End: 2021-10-15 | Stop reason: HOSPADM

## 2021-10-14 RX ORDER — AMOXICILLIN 250 MG
1-2 CAPSULE ORAL 2 TIMES DAILY
Qty: 30 TABLET | Refills: 0 | Status: SHIPPED | OUTPATIENT
Start: 2021-10-14

## 2021-10-14 RX ORDER — ONDANSETRON 2 MG/ML
4 INJECTION INTRAMUSCULAR; INTRAVENOUS EVERY 30 MIN PRN
Status: DISCONTINUED | OUTPATIENT
Start: 2021-10-14 | End: 2021-10-14 | Stop reason: HOSPADM

## 2021-10-14 RX ORDER — CEFAZOLIN SODIUM 2 G/100ML
2 INJECTION, SOLUTION INTRAVENOUS
Status: COMPLETED | OUTPATIENT
Start: 2021-10-14 | End: 2021-10-14

## 2021-10-14 RX ORDER — FENTANYL CITRATE 50 UG/ML
INJECTION, SOLUTION INTRAMUSCULAR; INTRAVENOUS PRN
Status: DISCONTINUED | OUTPATIENT
Start: 2021-10-14 | End: 2021-10-14

## 2021-10-14 RX ORDER — HYDROXYZINE HYDROCHLORIDE 10 MG/1
10 TABLET, FILM COATED ORAL EVERY 6 HOURS PRN
Status: DISCONTINUED | OUTPATIENT
Start: 2021-10-14 | End: 2021-10-15 | Stop reason: HOSPADM

## 2021-10-14 RX ORDER — HYDROMORPHONE HCL IN WATER/PF 6 MG/30 ML
0.2 PATIENT CONTROLLED ANALGESIA SYRINGE INTRAVENOUS
Status: DISCONTINUED | OUTPATIENT
Start: 2021-10-14 | End: 2021-10-15 | Stop reason: HOSPADM

## 2021-10-14 RX ORDER — OXYCODONE HYDROCHLORIDE 5 MG/1
5 TABLET ORAL EVERY 4 HOURS PRN
Status: CANCELLED | OUTPATIENT
Start: 2021-10-14

## 2021-10-14 RX ORDER — CELECOXIB 200 MG/1
400 CAPSULE ORAL ONCE
Status: COMPLETED | OUTPATIENT
Start: 2021-10-14 | End: 2021-10-14

## 2021-10-14 RX ORDER — POLYETHYLENE GLYCOL 3350 17 G/17G
17 POWDER, FOR SOLUTION ORAL DAILY
Status: DISCONTINUED | OUTPATIENT
Start: 2021-10-15 | End: 2021-10-15 | Stop reason: HOSPADM

## 2021-10-14 RX ORDER — ONDANSETRON 4 MG/1
4 TABLET, ORALLY DISINTEGRATING ORAL EVERY 6 HOURS PRN
Status: DISCONTINUED | OUTPATIENT
Start: 2021-10-14 | End: 2021-10-15 | Stop reason: HOSPADM

## 2021-10-14 RX ORDER — AMOXICILLIN 250 MG
1 CAPSULE ORAL 2 TIMES DAILY
Status: DISCONTINUED | OUTPATIENT
Start: 2021-10-14 | End: 2021-10-15 | Stop reason: HOSPADM

## 2021-10-14 RX ORDER — SERTRALINE HYDROCHLORIDE 100 MG/1
200 TABLET, FILM COATED ORAL DAILY
Status: DISCONTINUED | OUTPATIENT
Start: 2021-10-15 | End: 2021-10-15 | Stop reason: HOSPADM

## 2021-10-14 RX ORDER — ACETAMINOPHEN 325 MG/1
650 TABLET ORAL EVERY 4 HOURS PRN
Status: DISCONTINUED | OUTPATIENT
Start: 2021-10-17 | End: 2021-10-15 | Stop reason: HOSPADM

## 2021-10-14 RX ORDER — HYDROXYZINE HYDROCHLORIDE 10 MG/1
10 TABLET, FILM COATED ORAL EVERY 6 HOURS PRN
Qty: 30 TABLET | Refills: 0 | Status: SHIPPED | OUTPATIENT
Start: 2021-10-14

## 2021-10-14 RX ORDER — LABETALOL HYDROCHLORIDE 5 MG/ML
10 INJECTION, SOLUTION INTRAVENOUS
Status: DISCONTINUED | OUTPATIENT
Start: 2021-10-14 | End: 2021-10-14 | Stop reason: HOSPADM

## 2021-10-14 RX ORDER — TRANEXAMIC ACID 650 MG/1
1950 TABLET ORAL ONCE
Status: COMPLETED | OUTPATIENT
Start: 2021-10-14 | End: 2021-10-14

## 2021-10-14 RX ORDER — FENTANYL CITRATE 0.05 MG/ML
50 INJECTION, SOLUTION INTRAMUSCULAR; INTRAVENOUS
Status: DISCONTINUED | OUTPATIENT
Start: 2021-10-14 | End: 2021-10-14 | Stop reason: HOSPADM

## 2021-10-14 RX ORDER — OXYCODONE HYDROCHLORIDE 5 MG/1
10 TABLET ORAL EVERY 4 HOURS PRN
Status: DISCONTINUED | OUTPATIENT
Start: 2021-10-14 | End: 2021-10-15 | Stop reason: HOSPADM

## 2021-10-14 RX ORDER — HYDROMORPHONE HCL IN WATER/PF 6 MG/30 ML
0.4 PATIENT CONTROLLED ANALGESIA SYRINGE INTRAVENOUS
Status: DISCONTINUED | OUTPATIENT
Start: 2021-10-14 | End: 2021-10-15 | Stop reason: HOSPADM

## 2021-10-14 RX ORDER — DEXAMETHASONE SODIUM PHOSPHATE 4 MG/ML
INJECTION, SOLUTION INTRA-ARTICULAR; INTRALESIONAL; INTRAMUSCULAR; INTRAVENOUS; SOFT TISSUE PRN
Status: DISCONTINUED | OUTPATIENT
Start: 2021-10-14 | End: 2021-10-14

## 2021-10-14 RX ORDER — LEVOTHYROXINE SODIUM 150 UG/1
150 TABLET ORAL EVERY MORNING
Status: DISCONTINUED | OUTPATIENT
Start: 2021-10-15 | End: 2021-10-15 | Stop reason: HOSPADM

## 2021-10-14 RX ORDER — OXYCODONE HCL 10 MG/1
10 TABLET, FILM COATED, EXTENDED RELEASE ORAL ONCE
Status: COMPLETED | OUTPATIENT
Start: 2021-10-14 | End: 2021-10-14

## 2021-10-14 RX ORDER — CELECOXIB 200 MG/1
200 CAPSULE ORAL DAILY
Qty: 14 CAPSULE | Refills: 0 | Status: SHIPPED | OUTPATIENT
Start: 2021-10-14 | End: 2021-10-28

## 2021-10-14 RX ORDER — MAGNESIUM HYDROXIDE 1200 MG/15ML
LIQUID ORAL PRN
Status: DISCONTINUED | OUTPATIENT
Start: 2021-10-14 | End: 2021-10-14 | Stop reason: HOSPADM

## 2021-10-14 RX ORDER — CELECOXIB 100 MG/1
100 CAPSULE ORAL 2 TIMES DAILY
Status: DISCONTINUED | OUTPATIENT
Start: 2021-10-14 | End: 2021-10-15 | Stop reason: HOSPADM

## 2021-10-14 RX ORDER — PROCHLORPERAZINE MALEATE 5 MG
5 TABLET ORAL EVERY 6 HOURS PRN
Status: DISCONTINUED | OUTPATIENT
Start: 2021-10-14 | End: 2021-10-15 | Stop reason: HOSPADM

## 2021-10-14 RX ORDER — SODIUM CHLORIDE, SODIUM LACTATE, POTASSIUM CHLORIDE, CALCIUM CHLORIDE 600; 310; 30; 20 MG/100ML; MG/100ML; MG/100ML; MG/100ML
INJECTION, SOLUTION INTRAVENOUS CONTINUOUS
Status: DISCONTINUED | OUTPATIENT
Start: 2021-10-14 | End: 2021-10-15 | Stop reason: HOSPADM

## 2021-10-14 RX ORDER — PANTOPRAZOLE SODIUM 40 MG/1
40 TABLET, DELAYED RELEASE ORAL
Status: DISCONTINUED | OUTPATIENT
Start: 2021-10-14 | End: 2021-10-15 | Stop reason: HOSPADM

## 2021-10-14 RX ORDER — CEFAZOLIN SODIUM 1 G/3ML
1 INJECTION, POWDER, FOR SOLUTION INTRAMUSCULAR; INTRAVENOUS EVERY 8 HOURS
Status: COMPLETED | OUTPATIENT
Start: 2021-10-14 | End: 2021-10-15

## 2021-10-14 RX ORDER — CEFAZOLIN SODIUM 2 G/100ML
2 INJECTION, SOLUTION INTRAVENOUS SEE ADMIN INSTRUCTIONS
Status: DISCONTINUED | OUTPATIENT
Start: 2021-10-14 | End: 2021-10-14 | Stop reason: HOSPADM

## 2021-10-14 RX ORDER — PROPOFOL 10 MG/ML
INJECTION, EMULSION INTRAVENOUS CONTINUOUS PRN
Status: DISCONTINUED | OUTPATIENT
Start: 2021-10-14 | End: 2021-10-14

## 2021-10-14 RX ORDER — TOPIRAMATE 100 MG/1
100 TABLET, FILM COATED ORAL 2 TIMES DAILY
Status: DISCONTINUED | OUTPATIENT
Start: 2021-10-14 | End: 2021-10-15 | Stop reason: HOSPADM

## 2021-10-14 RX ORDER — NALOXONE HYDROCHLORIDE 0.4 MG/ML
0.4 INJECTION, SOLUTION INTRAMUSCULAR; INTRAVENOUS; SUBCUTANEOUS
Status: DISCONTINUED | OUTPATIENT
Start: 2021-10-14 | End: 2021-10-15 | Stop reason: HOSPADM

## 2021-10-14 RX ORDER — ONDANSETRON 2 MG/ML
4 INJECTION INTRAMUSCULAR; INTRAVENOUS EVERY 6 HOURS PRN
Status: DISCONTINUED | OUTPATIENT
Start: 2021-10-14 | End: 2021-10-15 | Stop reason: HOSPADM

## 2021-10-14 RX ORDER — LANOLIN ALCOHOL/MO/W.PET/CERES
1000 CREAM (GRAM) TOPICAL EVERY OTHER DAY
Status: DISCONTINUED | OUTPATIENT
Start: 2021-10-14 | End: 2021-10-15 | Stop reason: HOSPADM

## 2021-10-14 RX ORDER — NALOXONE HYDROCHLORIDE 0.4 MG/ML
0.2 INJECTION, SOLUTION INTRAMUSCULAR; INTRAVENOUS; SUBCUTANEOUS
Status: DISCONTINUED | OUTPATIENT
Start: 2021-10-14 | End: 2021-10-15 | Stop reason: HOSPADM

## 2021-10-14 RX ORDER — BISACODYL 10 MG
10 SUPPOSITORY, RECTAL RECTAL DAILY PRN
Status: DISCONTINUED | OUTPATIENT
Start: 2021-10-14 | End: 2021-10-15 | Stop reason: HOSPADM

## 2021-10-14 RX ORDER — OXYCODONE HYDROCHLORIDE 5 MG/1
5 TABLET ORAL EVERY 4 HOURS PRN
Status: DISCONTINUED | OUTPATIENT
Start: 2021-10-14 | End: 2021-10-15 | Stop reason: HOSPADM

## 2021-10-14 RX ORDER — ACETAMINOPHEN 325 MG/1
975 TABLET ORAL ONCE
Status: COMPLETED | OUTPATIENT
Start: 2021-10-14 | End: 2021-10-14

## 2021-10-14 RX ORDER — HYDROMORPHONE HCL IN WATER/PF 6 MG/30 ML
0.2 PATIENT CONTROLLED ANALGESIA SYRINGE INTRAVENOUS EVERY 5 MIN PRN
Status: DISCONTINUED | OUTPATIENT
Start: 2021-10-14 | End: 2021-10-14 | Stop reason: HOSPADM

## 2021-10-14 RX ADMIN — SODIUM CHLORIDE, POTASSIUM CHLORIDE, SODIUM LACTATE AND CALCIUM CHLORIDE: 600; 310; 30; 20 INJECTION, SOLUTION INTRAVENOUS at 11:40

## 2021-10-14 RX ADMIN — CEFAZOLIN 1 G: 1 INJECTION, POWDER, FOR SOLUTION INTRAMUSCULAR; INTRAVENOUS at 16:41

## 2021-10-14 RX ADMIN — TRAZODONE HYDROCHLORIDE 200 MG: 100 TABLET ORAL at 21:09

## 2021-10-14 RX ADMIN — OXYCODONE HYDROCHLORIDE 10 MG: 10 TABLET, FILM COATED, EXTENDED RELEASE ORAL at 06:25

## 2021-10-14 RX ADMIN — TOPIRAMATE 100 MG: 100 TABLET ORAL at 21:09

## 2021-10-14 RX ADMIN — ACETAMINOPHEN 975 MG: 325 TABLET, FILM COATED ORAL at 06:25

## 2021-10-14 RX ADMIN — FENTANYL CITRATE 50 MCG: 50 INJECTION, SOLUTION INTRAMUSCULAR; INTRAVENOUS at 10:23

## 2021-10-14 RX ADMIN — HYDROMORPHONE HYDROCHLORIDE 0.2 MG: 0.2 INJECTION, SOLUTION INTRAMUSCULAR; INTRAVENOUS; SUBCUTANEOUS at 11:34

## 2021-10-14 RX ADMIN — DEXAMETHASONE SODIUM PHOSPHATE 10 MG: 4 INJECTION, SOLUTION INTRA-ARTICULAR; INTRALESIONAL; INTRAMUSCULAR; INTRAVENOUS; SOFT TISSUE at 07:57

## 2021-10-14 RX ADMIN — ASPIRIN 325 MG: 325 TABLET, COATED ORAL at 16:46

## 2021-10-14 RX ADMIN — SODIUM CHLORIDE, POTASSIUM CHLORIDE, SODIUM LACTATE AND CALCIUM CHLORIDE: 600; 310; 30; 20 INJECTION, SOLUTION INTRAVENOUS at 07:03

## 2021-10-14 RX ADMIN — FENTANYL CITRATE 25 MCG: 50 INJECTION, SOLUTION INTRAMUSCULAR; INTRAVENOUS at 11:05

## 2021-10-14 RX ADMIN — ROPIVACAINE HYDROCHLORIDE 15 ML: 5 INJECTION, SOLUTION EPIDURAL; INFILTRATION; PERINEURAL at 08:52

## 2021-10-14 RX ADMIN — CEFAZOLIN SODIUM 2 G: 2 INJECTION, SOLUTION INTRAVENOUS at 07:40

## 2021-10-14 RX ADMIN — PHENYLEPHRINE HYDROCHLORIDE 100 MCG: 10 INJECTION INTRAVENOUS at 08:28

## 2021-10-14 RX ADMIN — SENNOSIDES AND DOCUSATE SODIUM 1 TABLET: 8.6; 5 TABLET ORAL at 21:09

## 2021-10-14 RX ADMIN — HYDROMORPHONE HYDROCHLORIDE 0.2 MG: 0.2 INJECTION, SOLUTION INTRAMUSCULAR; INTRAVENOUS; SUBCUTANEOUS at 13:26

## 2021-10-14 RX ADMIN — CELECOXIB 100 MG: 100 CAPSULE ORAL at 21:09

## 2021-10-14 RX ADMIN — PROPOFOL 75 MCG/KG/MIN: 10 INJECTION, EMULSION INTRAVENOUS at 07:50

## 2021-10-14 RX ADMIN — BUPIVACAINE HYDROCHLORIDE IN DEXTROSE 1.7 ML: 7.5 INJECTION, SOLUTION SUBARACHNOID at 07:47

## 2021-10-14 RX ADMIN — ACETAMINOPHEN 975 MG: 325 TABLET, FILM COATED ORAL at 13:31

## 2021-10-14 RX ADMIN — ACETAMINOPHEN 975 MG: 325 TABLET, FILM COATED ORAL at 21:09

## 2021-10-14 RX ADMIN — TRANEXAMIC ACID 1950 MG: 650 TABLET ORAL at 06:25

## 2021-10-14 RX ADMIN — CELECOXIB 400 MG: 200 CAPSULE ORAL at 06:25

## 2021-10-14 RX ADMIN — HYDROXYZINE HYDROCHLORIDE 10 MG: 10 TABLET, FILM COATED ORAL at 13:31

## 2021-10-14 RX ADMIN — SODIUM CHLORIDE, POTASSIUM CHLORIDE, SODIUM LACTATE AND CALCIUM CHLORIDE: 600; 310; 30; 20 INJECTION, SOLUTION INTRAVENOUS at 08:38

## 2021-10-14 RX ADMIN — MIDAZOLAM HYDROCHLORIDE 2 MG: 1 INJECTION, SOLUTION INTRAMUSCULAR; INTRAVENOUS at 07:20

## 2021-10-14 RX ADMIN — PANTOPRAZOLE SODIUM 40 MG: 40 TABLET, DELAYED RELEASE ORAL at 16:40

## 2021-10-14 RX ADMIN — OXYCODONE HYDROCHLORIDE 5 MG: 5 TABLET ORAL at 16:46

## 2021-10-14 RX ADMIN — OXYCODONE HYDROCHLORIDE 10 MG: 5 TABLET ORAL at 21:14

## 2021-10-14 RX ADMIN — MIDAZOLAM 1 MG: 1 INJECTION INTRAMUSCULAR; INTRAVENOUS at 07:44

## 2021-10-14 ASSESSMENT — MIFFLIN-ST. JEOR: SCORE: 1287.46

## 2021-10-14 NOTE — PLAN OF CARE
[facility]      OUTPATIENT PHYSICAL THERAPY EVALUATION  PLAN OF TREATMENT FOR OUTPATIENT REHABILITATION  (COMPLETE FOR INITIAL CLAIMS ONLY)  Patient's Last Name, First Name, M.I.  YOB: 1950  [place] salmon                        Provider's Name  [facility] Medical Record No.  [id number]                               Onset Date:  10/14/21   Start of Care Date:  10/14/21      Type:     _X_PT   ___OT   ___SLP Medical Diagnosis:  s/p R TKA                         PT Diagnosis:  impaired mobility   Visits from SOC:  1   _________________________________________________________________________________  Plan of Treatment/Functional Goals    Planned Interventions: bed mobility training, home exercise program, gait training, patient/family education, strengthening, stair training, transfer training     Goals: See Physical Therapy Goals on Care Plan in [employer] electronic health record.    Therapy Frequency: 2x/day  Predicted Duration of Therapy Intervention: 3 days   _________________________________________________________________________________    I CERTIFY THE NEED FOR THESE SERVICES FURNISHED UNDER        THIS PLAN OF TREATMENT AND WHILE UNDER MY CARE     (Physician co-signature of this document indicates review and certification of the therapy plan).              Certification date from: 10/14/21, Certification date to: 10/21/21    Referring Physician: Balta Hoffman MD            Initial Assessment        See Physical Therapy evaluation dated 10/14/21 in Epic electronic health record.

## 2021-10-14 NOTE — PROGRESS NOTES
Patient vital signs are at baseline: yes  Patient able to ambulate as they were prior to admission or with assist devices provided by therapies during their stay:  yes  Patient MUST void prior to discharge:  yes  Patient able to tolerate oral intake:  yes  Pain has adequate pain control using Oral analgesics:  Yes    Pt ambulated in halls with walker, gait belt and PT.  Voided in BR 325cc.  Denies nausea.  Knee ace drsg dry and intact.  Pain is around 6 level, given Oxycodone 5mg.

## 2021-10-14 NOTE — OR NURSING
Sanjuanita BURGOS RN auscultated heart murmer on patient, Dr Yeung to bedside to assess patient. Dr Dalton page, plan to notify surgeon of this finding. Pt denies chest pain or SOB.

## 2021-10-14 NOTE — ANESTHESIA PROCEDURE NOTES
Femoral and Adductor canal Procedure Note    Pre-Procedure   Staff -        Anesthesiologist:  William Yeung MD       Performed By: Anesthesiologist       Location: pre-op       Pre-Anesthestic Checklist: patient identified, IV checked, risks and benefits discussed, informed consent, pre-op evaluation, at physician/surgeon's request and post-op pain management  Timeout:       Correct Patient: Yes        Correct Procedure: Yes        Correct Site: Yes        Correct Position: Yes        Correct Laterality: Yes        Site Marked: Yes  Procedure Documentation  Procedure: Femoral, Adductor canal       Laterality: right       Patient Position: supine       Skin prep: Chloraprep      Local skin infiltrated with mL of 1% lidocaine.        Needle Type: short bevel       Needle Gauge: 21.        Needle Length (Inches): 3.13        Ultrasound guided       1. Ultrasound was used to identify targeted nerve, plexus, vascular marker, or fascial plane and place a needle adjacent to it in real-time.       2. Ultrasound was used to visualize the spread of anesthetic in close proximity to the above referenced structure.       3. A permanent image is entered into the patient's record.    Assessment/Narrative         The placement was negative for: blood aspirated, painful injection and site bleeding       Paresthesias: No.     Bolus given via needle..        Secured via.        Insertion/Infusion Method: Single Shot       Complications: none       Injection made incrementally with aspirations every 5 mL.    Medication(s) Administered   Bupivacaine 0.5% w/ 1:400K Epi (Injection), 15 mL   Comments:  Ultrasound Interpretation, peripheral nerve block    1.  Under ultrasound guidance, needle was inserted and placed in close proximity to the nerve.  2. Ultrasound was also used to visualize the spread of the anesthetic in close proximity to the nerve being blocked.  3. The nerve appeared anatomically normal.  4. There were no  apparent abnormal pathological findings.  5. A permanent ultrasound image was saved n the patient's record.    William Yeung MD   8:53 AM

## 2021-10-14 NOTE — PROGRESS NOTES
10/14/21 1530   Quick Adds   Quick Adds Certification   Type of Visit Initial PT Evaluation   Living Environment   People in home alone   Current Living Arrangements house   Home Accessibility stairs to enter home   Number of Stairs, Main Entrance 1   Stair Railings, Main Entrance none   Number of Stairs, Within Home, Primary none   Transportation Anticipated family or friend will provide   Living Environment Comments bed/bath on main level, daughter in law staying 1-2 days    Self-Care   Usual Activity Tolerance fair   Current Activity Tolerance fair   Regular Exercise No   Equipment Currently Used at Home walker, rolling   Activity/Exercise/Self-Care Comment patient independent with all self-cares, and ADLs, pt is retired for 23 years. DIL is helping, otherwise patient will be alone after Saturday.    Disability/Function   Fall history within last six months no   General Information   Onset of Illness/Injury or Date of Surgery 10/14/21   Referring Physician Balta Hoffman MD   Patient/Family Therapy Goals Statement (PT) return home    Pertinent History of Current Problem (include personal factors and/or comorbidities that impact the POC) s/p R TKA, hx L TKA appro 4 years ago   Weight-Bearing Status - RLE weight-bearing as tolerated   Cognition   Orientation Status (Cognition) oriented x 4   Affect/Mental Status (Cognition) WFL   Follows Commands (Cognition) WFL   Pain Assessment   Patient Currently in Pain Yes, see Vital Sign flowsheet  (R TKA pain rated 4/10 )   Bed Mobility   Comment (Bed Mobility) SBA supine <> sit    Transfers   Transfer Safety Comments CGA sit <> stand with FWW    Gait/Stairs (Locomotion)   [place] Level (Gait) contact guard   Assistive Device (Gait) walker, front-wheeled   Distance in Feet (Required for LE Total Joints) 120'    Pattern (Gait) step-through   Sensory Examination   Sensory Perception Comments denies sensation changes    Clinical Impression   Criteria for Skilled  Therapeutic Intervention yes, treatment indicated   PT Diagnosis (PT) impaired mobility   Influenced by the following impairments weakness, pain, impaired balance    Functional limitations due to impairments fall risk, decreased act jaymie    Clinical Presentation Stable/Uncomplicated   Clinical Presentation Rationale clinical judgement    Clinical Decision Making (Complexity) low complexity   Therapy Frequency (PT) 2x/day   Predicted Duration of Therapy Intervention (days/wks) 3 days    Planned Therapy Interventions (PT) bed mobility training;home exercise program;gait training;patient/family education;strengthening;stair training;transfer training   Anticipated Equipment Needs at Discharge (PT) walker, rolling   Risk & Benefits of therapy have been explained evaluation/treatment results reviewed;care plan/treatment goals reviewed;risks/benefits reviewed;current/potential barriers reviewed;participants voiced agreement with care plan;participants included;patient   PT Discharge Planning    PT Discharge Recommendation (DC Rec)   (defer to ortho )   PT Rationale for DC Rec Recommend home PT upon inpatient d/c as patient is s/p R TKA, unable to drive and reports little support beyond Saturday this week (daughter in law staying with her until then). Anticipate patient will progress to SBA/Mod I with all mobility tomorrow.    PT Brief overview of current status  SBA bed mob, CGA gait/transfers    Therapy Certification   Start of care date 10/14/21   Certification date from 10/14/21   Certification date to 10/21/21   Medical Diagnosis s/p R TKA    Total Evaluation Time   Total Evaluation Time (Minutes) 10

## 2021-10-14 NOTE — ANESTHESIA PREPROCEDURE EVALUATION
Anesthesia Pre-Procedure Evaluation    Patient: Amalia Britton   MRN: [id number] : 1950        Preoperative Diagnosis: Osteoarthritis of right knee [M17.11]    Procedure : Procedure(s):  RIGHT TOTAL KNEE ARTHROPLASTY          Past Medical History:   Diagnosis Date     Anxiety      Bleeding disorder (H)     anemia-4 units of blood     Bleeding disorder (H)      Cerebral aneurysm      Cervical radiculitis      Compression fracture of L2 lumbar vertebra (H)      Compression fracture of L2 lumbar vertebra (H)      Constipation      Depression      Depression      Diverticulosis      Diverticulosis      Eczema      Eczema      Facial dysmorphia      Fasciitis      Female dyspareunia      Gastroesophageal reflux disease      Heartburn      Hypothyroidism      Hypothyroidism      Impaired intestinal absorption      Insomnia      Internal carotid aneurysm      Neck pain      Nerve pain      Osteoporosis      Osteoporosis      Pernicious anemia      Pernicious anemia      Psychosis (H)      Retention of urine      Seasonal allergies      Thrombocytopenia (H)      Vitamin D deficiency       Past Surgical History:   Procedure Laterality Date     ABDOMINOPLASTY       ARTHROPLASTY KNEE Left 6/15/2017    Procedure: ARTHROPLASTY KNEE;  LEFT TOTAL KNEE ARTHROPLASTY (BIOMET)^;  Surgeon: Balta Hoffman MD;  Location:  OR     AS REPAIR OF HAMMERTOE,ONE       COLONOSCOPY       ENT SURGERY      septoplasty,palate/uvula surgery     EYE SURGERY Bilateral     cataract     GASTRIC BYPASS       GYN SURGERY      pelvic laparoscopy,tubal ligation     IMPLANT STIMULATOR SACRAL NERVE STAGE ONE  2014    Procedure: IMPLANT STIMULATOR SACRAL NERVE STAGE ONE;  Surgeon: Manan Jacques MD;  Location:  OR     IMPLANT STIMULATOR SACRAL NERVE STAGE TWO  7/3/2014    Procedure: IMPLANT STIMULATOR SACRAL NERVE STAGE TWO;  Surgeon: Manan Jacques MD;  Location:  OR     LAPAROSCOPY,VAG HYSTERECTOMY      BSO     ORTHOPEDIC  SURGERY      carpal tunnel,bunion,hammertoe,      Allergies   Allergen Reactions     Cold Medicine [Gnp Flu Cold-Cough] Anaphylaxis     Bees Hives and Swelling     Nuts Hives and Swelling      Social History     Tobacco Use     Smoking status: Former Smoker     Packs/day: 1.00     Years: 18.00     Pack years: 18.00     Types: Cigarettes     Quit date: 1984     Years since quittin.3     Smokeless tobacco: Never Used   Substance Use Topics     Alcohol use: Yes     Comment: 3-4/month      Wt Readings from Last 1 Encounters:   10/14/21 79.8 kg (176 lb)        Anesthesia Evaluation   Pt has had prior anesthetic.     History of anesthetic complications (2017 after her TKA (GA) she had memory issues for weeks and felt off.  Explained it could have been the opioids)       ROS/MED HX  ENT/Pulmonary:    (-) sleep apnea   Neurologic: Comment: Cerebral aneurysm, just monitored, non surgical.  No CVA      Cardiovascular:       METS/Exercise Tolerance:     Hematologic:       Musculoskeletal:       GI/Hepatic: Comment: Sp gastric bypass    (+) GERD, Asymptomatic on medication,     Renal/Genitourinary:       Endo:     (+) thyroid problem, hypothyroidism, Obesity (BMI 31),     Psychiatric/Substance Use:     (+) psychiatric history anxiety and depression     Infectious Disease:       Malignancy:       Other:            Physical Exam    Airway        Mallampati: II   TM distance: > 3 FB   Neck ROM: full   Mouth opening: > 3 cm    Respiratory Devices and Support         Dental       (+) missing      Cardiovascular   cardiovascular exam normal          Pulmonary   pulmonary exam normal                OUTSIDE LABS:  CBC:   Lab Results   Component Value Date    WBC Canceled, Test credited 04/10/2017    WBC 5.5 04/10/2017    HGB 9.5 (L) 2017    HGB 9.6 (L) 2017    HCT Canceled, Test credited 04/10/2017    HCT 28.1 (L) 04/10/2017     2017     06/15/2017     BMP:   Lab Results   Component Value Date     CR 0.54 06/18/2017    CR 0.56 06/15/2017    GLC 95 06/17/2017     COAGS: No results found for: PTT, INR, FIBR  POC: No results found for: BGM, HCG, HCGS  HEPATIC: No results found for: ALBUMIN, PROTTOTAL, ALT, AST, GGT, ALKPHOS, BILITOTAL, BILIDIRECT, DOUGLAS  OTHER: No results found for: PH, LACT, A1C, RAY, PHOS, MAG, LIPASE, AMYLASE, TSH, T4, T3, CRP, SED    Anesthesia Plan    ASA Status:  2   NPO Status:  NPO Appropriate    Anesthesia Type: Spinal.              Consents    Anesthesia Plan(s) and associated risks, benefits, and realistic alternatives discussed. Questions answered and patient/representative(s) expressed understanding.     - Discussed with:  Patient         Postoperative Care    Pain management: IV analgesics, Peripheral nerve block (Single Shot).   PONV prophylaxis: Ondansetron (or other 5HT-3), Dexamethasone or Solumedrol     Comments:    H&P reviewed    Decadron 10mg IV  No opioid in OR            William Yeung MD

## 2021-10-14 NOTE — BRIEF OP NOTE
[facility]    Brief Operative Note    Pre-operative diagnosis: Osteoarthritis of right knee [M17.11]  Post-operative diagnosis Same as pre-operative diagnosis    Procedure: Procedure(s):  RIGHT TOTAL KNEE ARTHROPLASTY  Surgeon: Surgeon(s) and Role:     * Balta Hoffman MD - Primary     * Teri Bautista PA-C - Assisting  Anesthesia: Spinal   Estimated Blood Loss: Less than 50 ml    Drains: None  Specimens: * No specimens in log *  Findings:   None.  Complications: None.  Implants:   Implant Name Type Inv. Item Serial No.  Lot No. LRB No. Used Action   BONE CEMENT RADIOPAQUE SIMPLEX HV FULL DOSE 6194-1-001 - [id number] Cement, Bone BONE CEMENT RADIOPAQUE SIMPLEX HV FULL DOSE 6194-1-001  [employer] [id number] Right 2 Implanted   IMP COMP PATELLA S&N UHMWPE 32X9MM OVAL [id number] - [id number] Total Joint Component/Insert IMP COMP PATELLA S&N UHMWPE 32X9MM OVAL [id number]  [employer] [id number] Right 1 Implanted   IMP BASEPLATE TIBIAL PETER II SZ 3 RT TI [id number] - [id number] Total Joint Component/Insert IMP BASEPLATE TIBIAL PETER II SZ 3 RT TI [id number]  [employer]-R [id number] Right 1 Implanted   IMP COMP FEMORAL S&N LEGION PS NP SZ5 RT [id number] - [id number] Total Joint Component/Insert IMP COMP FEMORAL S&N LEGION PS NP SZ5 RT [id number]  [employer] [id number] Right 1 Implanted   IMP COMP KNEE S&N LEGION PS HI-FLEX XLPE SZ3-4 10MM [id number] - [id number] Total Joint Component/Insert IMP COMP KNEE S&N LEGION PS HI-FLEX XLPE SZ3-4 10MM [id number]  [employer] [id number] Right 1 Implanted

## 2021-10-14 NOTE — PROGRESS NOTES
Pt arrived from PACU rating pain #7.  Knee drsg dry and intact with ice to knee.  CMS good bilaterally to ft.  Requesting bedpan.

## 2021-10-14 NOTE — ANESTHESIA CARE TRANSFER NOTE
Patient: Amalia Britton    Procedure: Procedure(s):  RIGHT TOTAL KNEE ARTHROPLASTY       Diagnosis: Osteoarthritis of right knee [M17.11]  Diagnosis Additional Information: No value filed.    Anesthesia Type:   Spinal     Note:    Oropharynx: oropharynx clear of all foreign objects and spontaneously breathing  Level of Consciousness: awake and drowsy  Oxygen Supplementation: nasal cannula  Level of Supplemental Oxygen (L/min / FiO2): 5  Independent Airway: airway patency satisfactory and stable  Dentition: dentition unchanged  Vital Signs Stable: post-procedure vital signs reviewed and stable  Report to RN Given: handoff report given  Patient transferred to: PACU    Handoff Report: Identifed the Patient, Identified the Reponsible Provider, Reviewed the pertinent medical history, Discussed the surgical course, Reviewed Intra-OP anesthesia mangement and issues during anesthesia, Set expectations for post-procedure period and Allowed opportunity for questions and acknowledgement of understanding      Vitals:  Vitals Value Taken Time   BP     Temp     Pulse 79 10/14/21 1028   Resp 10 10/14/21 1028   SpO2 99 % 10/14/21 1028   Vitals shown include unvalidated device data.    Electronically Signed By: MICHAELLE Apple CRNA  October 14, 2021  10:29 AM

## 2021-10-14 NOTE — OP NOTE
Procedure Date: 10/14/2021    PREOPERATIVE DIAGNOSIS:  Right knee osteoarthritis.    POSTOPERATIVE DIAGNOSIS:  Right knee osteoarthritis.    PROCEDURE PERFORMED:  Right total knee arthroplasty.    SURGEON:  Balta Hoffman MD    ASSISTANT:  Teri Jhaveri PA-C    ANESTHESIA:  Regional.    COMPLICATIONS:  None.    DESCRIPTION OF PROCEDURE:  Ms. Britton was brought to the operating room.  An anesthetic was administered.  She received prophylactic antibiotics.  These will be discontinued within 24 hours of surgery.  She will be on aspirin for DVT prophylaxis.  Her right lower extremity was prepped and draped in the usual sterile fashion and timeout was called.    The limb was exsanguinated and tourniquet inflated.  I made sharp incision from just superior to the superior pole of the patella to just medial to the tibial tubercle.  Sharp through the skin and subcutaneous.  Gained the extensor mechanism.  I made a medial arthrotomy.  A straw-colored effusion was evacuated.  There was full-thickness chondral loss in the lateral facet of the patellofemoral compartment and the lateral compartment of the tibiofemoral articulation.  I measured the patella to 21, cut it to 14.  It was prepared for a 32 button.  I placed the intramedullary guide in the femur.  Used a 6-degree valgus bushing.  I made my distal femoral cut.  She hyperextended a little bit preoperatively, so I did not take any additional bone as I usually do for a PS component.  Sized it for a Legion size 5.  I made my 5 in 1 and box cuts.    I then set the tibial extramedullary cutting guide in the long axis of the tibia.  I took 2 mm from the more deficient lateral tibial plateau.  Sized it for a baseplate.    I cemented everything in place with a 10.  During cementation, I copiously irrigated and soaked for a total of 3 minutes with a dilute Betadine solution.  I then trialed an 11, it would come to full extension was quite tight and went with a 10.  No lateral  release was needed.  I infiltrated the medial and lateral femoral periosteum with a dilute analgesic cocktail.  I then bathed the posterior capsule with and cycled it through range of motion.  We closed the extensor mechanism with Ethibond, 2-0 in the subcu, staples in the skin.  Bulky sterile dressing was applied.  She emerged from anesthesia without difficulty.  Returned to the recovery room in stable condition.  All sponge and needle counts were correct at the end of the procedure.  There were no complications.    Balta Hoffman MD        D: 10/14/2021   T: 10/14/2021   MT: [id number]    Name:     JIAN WEEMS  MRN:      [id number]        Account:        [id number]   :      1950           Procedure Date: 10/14/2021     Document: [id number]

## 2021-10-14 NOTE — ANESTHESIA PROCEDURE NOTES
Intrathecal Procedure Note    Pre-Procedure   Staff -        Anesthesiologist:  William Yeung MD       Performed By: anesthesiologist       Location: OR       Pre-Anesthestic Checklist: patient identified, IV checked, risks and benefits discussed, informed consent, monitors and equipment checked and pre-op evaluation  Timeout:       Correct Patient: Yes        Correct Procedure: Yes        Correct Position: Yes   Procedure Documentation  Procedure: intrathecal       Patient Position: sitting       Patient Prep/Sterile Barriers: sterile gloves, mask, patient draped, 3 rounds of betadine.  Waited for it to completely dry prior to procedure       Skin prep: Betadine       Insertion Site: L4-5. (midline approach).       Spinal Needle Type: Nerissa-Duke       Introducer used      # of attempts: 3 and  # of redirects:     Assessment/Narrative         Paresthesias: No.       CSF fluid: clear.    Medication(s) Administered   0.75% Hyperbaric Bupivacaine (Intrathecal), 1.7 mL   Comments:  1% lidocaine to skin  No complications    Os at L34 midline and Right paramedian with 1st two attempts

## 2021-10-14 NOTE — ANESTHESIA POSTPROCEDURE EVALUATION
Patient: Amalia Britton    Procedure: Procedure(s):  RIGHT TOTAL KNEE ARTHROPLASTY       Diagnosis:Osteoarthritis of right knee [M17.11]  Diagnosis Additional Information: No value filed.    Anesthesia Type:  Spinal    Note:  Disposition: Inpatient   Postop Pain Control: Uneventful            Sign Out: Well controlled pain   PONV: No   Neuro/Psych: Uneventful            Sign Out: Acceptable/Baseline neuro status   Airway/Respiratory: Uneventful            Sign Out: Acceptable/Baseline resp. status   CV/Hemodynamics: Uneventful            Sign Out: Acceptable CV status; No obvious hypovolemia; No obvious fluid overload   Other NRE: NONE   DID A NON-ROUTINE EVENT OCCUR? No           Last vitals:  Vitals Value Taken Time   /67 10/14/21 1120   Temp     Pulse 78 10/14/21 1124   Resp 14 10/14/21 1124   SpO2 99 % 10/14/21 1121   Vitals shown include unvalidated device data.    Electronically Signed By: William Yeung MD  October 14, 2021  11:25 AM

## 2021-10-15 ENCOUNTER — APPOINTMENT (OUTPATIENT)
Dept: OCCUPATIONAL THERAPY | Facility: CLINIC | Age: 71
End: 2021-10-15
Attending: ORTHOPAEDIC SURGERY
Payer: MEDICARE

## 2021-10-15 ENCOUNTER — APPOINTMENT (OUTPATIENT)
Dept: PHYSICAL THERAPY | Facility: CLINIC | Age: 71
End: 2021-10-15
Attending: ORTHOPAEDIC SURGERY
Payer: MEDICARE

## 2021-10-15 VITALS
OXYGEN SATURATION: 97 % | HEART RATE: 72 BPM | TEMPERATURE: 98.1 F | WEIGHT: 176 LBS | DIASTOLIC BLOOD PRESSURE: 52 MMHG | SYSTOLIC BLOOD PRESSURE: 102 MMHG | RESPIRATION RATE: 16 BRPM | HEIGHT: 63 IN | BODY MASS INDEX: 31.18 KG/M2

## 2021-10-15 LAB
GLUCOSE BLDC GLUCOMTR-MCNC: 87 MG/DL (ref 70–99)
HGB BLD-MCNC: 8.1 G/DL (ref 11.7–15.7)

## 2021-10-15 PROCEDURE — 82962 GLUCOSE BLOOD TEST: CPT

## 2021-10-15 PROCEDURE — 85018 HEMOGLOBIN: CPT | Performed by: ORTHOPAEDIC SURGERY

## 2021-10-15 PROCEDURE — 97535 SELF CARE MNGMENT TRAINING: CPT | Mod: GO | Performed by: OCCUPATIONAL THERAPIST

## 2021-10-15 PROCEDURE — 97110 THERAPEUTIC EXERCISES: CPT | Mod: GP

## 2021-10-15 PROCEDURE — 36415 COLL VENOUS BLD VENIPUNCTURE: CPT | Performed by: ORTHOPAEDIC SURGERY

## 2021-10-15 PROCEDURE — 97116 GAIT TRAINING THERAPY: CPT | Mod: GP

## 2021-10-15 PROCEDURE — 97165 OT EVAL LOW COMPLEX 30 MIN: CPT | Mod: GO | Performed by: OCCUPATIONAL THERAPIST

## 2021-10-15 PROCEDURE — 250N000011 HC RX IP 250 OP 636: Performed by: ORTHOPAEDIC SURGERY

## 2021-10-15 PROCEDURE — 250N000013 HC RX MED GY IP 250 OP 250 PS 637: Performed by: ORTHOPAEDIC SURGERY

## 2021-10-15 RX ORDER — OXYCODONE HYDROCHLORIDE 5 MG/1
5-10 TABLET ORAL EVERY 4 HOURS PRN
Qty: 60 TABLET | Refills: 0 | Status: SHIPPED | OUTPATIENT
Start: 2021-10-15

## 2021-10-15 RX ADMIN — TOPIRAMATE 100 MG: 100 TABLET ORAL at 08:13

## 2021-10-15 RX ADMIN — HYDROXYZINE HYDROCHLORIDE 10 MG: 10 TABLET, FILM COATED ORAL at 00:21

## 2021-10-15 RX ADMIN — PANTOPRAZOLE SODIUM 40 MG: 40 TABLET, DELAYED RELEASE ORAL at 06:33

## 2021-10-15 RX ADMIN — OXYCODONE HYDROCHLORIDE 10 MG: 5 TABLET ORAL at 10:24

## 2021-10-15 RX ADMIN — LEVOTHYROXINE SODIUM 150 MCG: 150 TABLET ORAL at 08:13

## 2021-10-15 RX ADMIN — ASPIRIN 325 MG: 325 TABLET, COATED ORAL at 08:13

## 2021-10-15 RX ADMIN — SENNOSIDES AND DOCUSATE SODIUM 1 TABLET: 8.6; 5 TABLET ORAL at 08:13

## 2021-10-15 RX ADMIN — HYDROMORPHONE HYDROCHLORIDE 0.2 MG: 0.2 INJECTION, SOLUTION INTRAMUSCULAR; INTRAVENOUS; SUBCUTANEOUS at 09:03

## 2021-10-15 RX ADMIN — CEFAZOLIN 1 G: 1 INJECTION, POWDER, FOR SOLUTION INTRAMUSCULAR; INTRAVENOUS at 00:21

## 2021-10-15 RX ADMIN — POLYETHYLENE GLYCOL 3350 17 G: 17 POWDER, FOR SOLUTION ORAL at 08:14

## 2021-10-15 RX ADMIN — OXYCODONE HYDROCHLORIDE 10 MG: 5 TABLET ORAL at 06:33

## 2021-10-15 RX ADMIN — SERTRALINE HYDROCHLORIDE 200 MG: 100 TABLET ORAL at 08:13

## 2021-10-15 RX ADMIN — ACETAMINOPHEN 975 MG: 325 TABLET, FILM COATED ORAL at 06:32

## 2021-10-15 RX ADMIN — MULTIPLE VITAMINS W/ MINERALS TAB 1 TABLET: TAB at 08:13

## 2021-10-15 RX ADMIN — CELECOXIB 100 MG: 100 CAPSULE ORAL at 08:13

## 2021-10-15 ASSESSMENT — ACTIVITIES OF DAILY LIVING (ADL): PREVIOUS_RESPONSIBILITIES: MEAL PREP;HOUSEKEEPING;LAUNDRY;SHOPPING;YARDWORK;MEDICATION MANAGEMENT;FINANCES;DRIVING

## 2021-10-15 NOTE — PLAN OF CARE
Occupational Therapy Discharge Summary    Reason for therapy discharge:    Discharged to home.    Progress towards therapy goal(s). See goals on Care Plan in [facility] electronic health record for goal details.  Goals met    Therapy recommendation(s):    No further therapy is recommended.  Patient's daughter in law will be staying 2 days to assist with I/ADLs as needed. Patient has adaptive equipment including raised toilet seat and grab bars, is planning to purchase a leg .

## 2021-10-15 NOTE — PROGRESS NOTES
10/15/21 0821   Quick Adds   Type of Visit Initial Occupational Therapy Evaluation   Living Environment   People in home alone   Current Living Arrangements house   Home Accessibility stairs to enter home   Number of Stairs, Main Entrance 1   Stair Railings, Main Entrance none   Number of Stairs, Within Home, Primary none   Stair Railings, Within Home, Primary none   Transportation Anticipated family or friend will provide   Living Environment Comments All needs met on main level, daughter in law staying for 2 days. Walk-in shower with grab bars, anti-slip mat. Has raised toilet seat   Self-Care   Usual Activity Tolerance fair   Current Activity Tolerance fair   Regular Exercise No   Equipment Currently Used at Home none  (has RTS, grab bars in shower)   Activity/Exercise/Self-Care Comment Pt reports IND at baseline with I/ADLs   Instrumental Activities of Daily Living (IADL)   Previous Responsibilities meal prep;housekeeping;laundry;shopping;yardwork;medication management;finances;driving   Disability/Function   Walking or Climbing Stairs Difficulty no   Dressing/Bathing Difficulty no   Toileting issues no   Doing Errands Independently Difficulty (such as shopping) no   Fall history within last six months no   General Information   Onset of Illness/Injury or Date of Surgery 10/14/21   Referring Physician Balta Hoffman MD   Patient/Family Therapy Goal Statement (OT) to go home   Additional Occupational Profile Info/Pertinent History of Current Problem POD 1 R TKA   Existing Precautions/Restrictions fall   Cognitive Status Examination   Affect/Mental Status (Cognitive) WFL   Follows Commands WFL   Visual Perception   Visual Impairment/Limitations corrective lenses full-time   Sensory   Sensory Quick Adds No deficits were identified   Pain Assessment   Patient Currently in Pain Yes, see Vital Sign flowsheet  (6.5)   Range of Motion Comprehensive   Comment, General Range of Motion R LE limited s/p TKA   Strength  Comprehensive (MMT)   Comment, General Manual Muscle Testing (MMT) Assessment R LE limited s/p TKA   Bed Mobility   Bed Mobility supine-sit;sit-supine   Supine-Sit [place] (Bed Mobility) minimum assist (75% patient effort)   Sit-Supine [place] (Bed Mobility) minimum assist (75% patient effort)   Comment (Bed Mobility) min A to bring R LE into bed   Transfers   Transfers sit-stand transfer;toilet transfer   Sit-Stand Transfer   Sit-Stand [place] (Transfers) supervision;verbal cues   Assistive Device (Sit-Stand Transfers) walker, front-wheeled   Toilet Transfer   Type (Toilet Transfer) sit-stand;stand-sit   [place] Level (Toilet Transfer) supervision   Assistive Device (Toilet Transfer) grab bars/safety frame   Activities of Daily Living   BADL Assessment lower body dressing;toileting   Lower Body Dressing Assessment   [place] Level (Lower Body Dressing) supervision   Comment (Lower Body Dressing) Pt able to don socks and LB clothing seated EOB   Toileting   [place] Level (Toileting) supervision   Assistive Devices (Toileting) grab bar, toilet   Clinical Impression   Criteria for Skilled Therapeutic Interventions Met (OT) yes;meets criteria;skilled treatment is necessary   OT Diagnosis Impaired independence with I/adlS   OT Problem List-Impairments impacting ADL activity tolerance impaired;balance;mobility;range of motion (ROM);strength;pain;post-surgical precautions   Assessment of Occupational Performance 3-5 Performance Deficits   Identified Performance Deficits mobility, dressing, toileting, bathing   Planned Therapy Interventions (OT) ADL retraining;transfer training   Clinical Decision Making Complexity (OT) low complexity   Therapy Frequency (OT) 1x eval and treat   Anticipated Equipment Needs Upon Discharge (OT) tub bench  (leg )   Risk & Benefits of therapy have been explained evaluation/treatment results reviewed;care plan/treatment goals reviewed;risks/benefits  reviewed;current/potential barriers reviewed;participants voiced agreement with care plan;participants included;patient   OT Discharge Planning    OT Rationale for DC Rec Pt mobilizing with overall SBA, has adaptive equipment for home and is planning to get more. Daughter in law staying for 2 nights. Anticipate pt safe to discharge with assist for I/ADLs   Therapy Certification   Start of Care Date 10/15/21   Certification date from 10/15/21   Certification date to 10/15/21   Medical Diagnosis R TKA   Total Evaluation Time (Minutes)   Total Evaluation Time (Minutes) 5

## 2021-10-15 NOTE — PROGRESS NOTES
Patient vital signs are at baseline: Yes  Patient able to ambulate as they were prior to admission or with assist devices provided by therapies during their stay:  Yes  Patient MUST void prior to discharge:  Yes  Patient able to tolerate oral intake:  Yes  Pain has adequate pain control using Oral analgesics:  Yes  Alert and oriented x 4. Ambulated with assist of one to toilet, voiding adequately. Pain managed by tylenol, oxycodone and atarax. Dressing to RLE intact. CMS intact.

## 2021-10-15 NOTE — PROGRESS NOTES
"Amalia THOMAS Rotar  10/15/2021  POD # 1    Doing well.  No immediate surgical complications identified.  No excessive bleeding  Pain well-controlled.  Tolerating physical therapy and rehabilitation well.  Objective:  Blood pressure 110/57, pulse 65, temperature 98.1  F (36.7  C), resp. rate 16, height 1.6 m (5' 3\"), weight 79.8 kg (176 lb), SpO2 97 %, not currently breastfeeding.    Temperatures:  Current - Temp: 98.1  F (36.7  C); Max - Temp  Av.6  F (37  C)  Min: 98.1  F (36.7  C)  Max: 98.9  F (37.2  C)  Pulse range: Pulse  Av.2  Min: 65  Max: 80  Blood pressure range: Systolic (24hrs), Av , Min:74 , Max:150   ; Diastolic (24hrs), Av, Min:41, Max:85    Exam:  CMS: intact  alert, stable, dressing dry      Labs:  No results for input(s): POTASSIUM in the last 93311 hours.  Recent Labs   Lab Test 17  0650 17  0655 06/15/17  0650   HGB 9.5* 9.6* 11.7     No results for input(s): INR in the last 43137 hours.  Recent Labs   Lab Test 17  0627 06/15/17  1418 04/10/17  1506    202 368  Canceled, Test credited       PLAN:  Continue physical therapy  Pain control measures  Discharge today      "

## 2021-10-15 NOTE — PLAN OF CARE
[facility]      OUTPATIENT OCCUPATIONAL THERAPY  EVALUATION  PLAN OF TREATMENT FOR OUTPATIENT REHABILITATION  (COMPLETE FOR INITIAL CLAIMS ONLY)  Patient's Last Name, First Name, M.I.  YOB: 1950  [place] salmon                          Provider's Name  [facility] Medical Record No.  [id number]                               Onset Date:  10/14/21   Start of Care Date:  10/15/21     Type:     ___PT   _X_OT   ___SLP Medical Diagnosis:  R TKA                        OT Diagnosis:  Impaired independence with I/adlS   Visits from SOC:  1   _________________________________________________________________________________  Plan of Treatment/Functional Goals    Planned Interventions: ADL retraining, transfer training   Goals: See Occupational Therapy Goals on Care Plan in [facility] electronic health record.    Therapy Frequency: 1x eval and treat  Predicted Duration of Therapy Intervention:    _________________________________________________________________________________    I CERTIFY THE NEED FOR THESE SERVICES FURNISHED UNDER        THIS PLAN OF TREATMENT AND WHILE UNDER MY CARE     (Physician co-signature of this document indicates review and certification of the therapy plan).                Certification date from: 10/15/21, Certification date to: 10/15/21    Referring Physician: Balta Hoffman MD            Initial Assessment        See Occupational Therapy evaluation dated 10/15/21 in Epic electronic health record.

## 2021-10-20 ENCOUNTER — LAB REQUISITION (OUTPATIENT)
Dept: LAB | Facility: CLINIC | Age: 71
End: 2021-10-20
Payer: MEDICARE

## 2021-10-20 LAB
ALBUMIN UR-MCNC: NEGATIVE MG/DL
APPEARANCE UR: CLEAR
BILIRUB UR QL STRIP: NEGATIVE
COLOR UR AUTO: YELLOW
GLUCOSE UR STRIP-MCNC: NEGATIVE MG/DL
HGB UR QL STRIP: NEGATIVE
KETONES UR STRIP-MCNC: 10 MG/DL
LEUKOCYTE ESTERASE UR QL STRIP: NEGATIVE
NITRATE UR QL: NEGATIVE
PH UR STRIP: 7.5 [PH] (ref 5–7)
SP GR UR STRIP: 1.01 (ref 1–1.03)
UROBILINOGEN UR STRIP-MCNC: 3 MG/DL

## 2021-10-20 PROCEDURE — 81003 URINALYSIS AUTO W/O SCOPE: CPT | Mod: ORL | Performed by: ORTHOPAEDIC SURGERY

## 2021-10-20 PROCEDURE — 87086 URINE CULTURE/COLONY COUNT: CPT | Mod: ORL | Performed by: ORTHOPAEDIC SURGERY

## 2021-10-23 LAB
BACTERIA UR CULT: ABNORMAL
BACTERIA UR CULT: ABNORMAL

## 2021-10-31 ENCOUNTER — HEALTH MAINTENANCE LETTER (OUTPATIENT)
Age: 71
End: 2021-10-31

## 2022-10-22 ENCOUNTER — HEALTH MAINTENANCE LETTER (OUTPATIENT)
Age: 72
End: 2022-10-22

## 2022-12-10 ENCOUNTER — HEALTH MAINTENANCE LETTER (OUTPATIENT)
Age: 72
End: 2022-12-10
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
## 2024-01-14 ENCOUNTER — HEALTH MAINTENANCE LETTER (OUTPATIENT)
Age: 74
End: 2024-01-14

## 2024-12-05 ENCOUNTER — LAB REQUISITION (OUTPATIENT)
Dept: LAB | Facility: CLINIC | Age: 74
End: 2024-12-05
Payer: COMMERCIAL

## 2024-12-05 DIAGNOSIS — N39.0 URINARY TRACT INFECTION, SITE NOT SPECIFIED: ICD-10-CM

## 2025-02-15 ENCOUNTER — TRANSFERRED RECORDS (OUTPATIENT)
Dept: HEALTH INFORMATION MANAGEMENT | Facility: CLINIC | Age: 75
End: 2025-02-15

## 2025-02-20 ENCOUNTER — TRANSFERRED RECORDS (OUTPATIENT)
Dept: HEALTH INFORMATION MANAGEMENT | Facility: CLINIC | Age: 75
End: 2025-02-20

## 2025-03-20 ENCOUNTER — TRANSCRIBE ORDERS (OUTPATIENT)
Dept: OTHER | Age: 75
End: 2025-03-20

## 2025-03-20 ENCOUNTER — TRANSFERRED RECORDS (OUTPATIENT)
Dept: HEALTH INFORMATION MANAGEMENT | Facility: CLINIC | Age: 75
End: 2025-03-20
Payer: COMMERCIAL

## 2025-03-20 DIAGNOSIS — Z79.01 LONG TERM CURRENT USE OF ANTICOAGULANT THERAPY: ICD-10-CM

## 2025-03-20 DIAGNOSIS — H43.11 VITREOUS HEMORRHAGE OF RIGHT EYE (H): Primary | ICD-10-CM

## 2025-03-20 DIAGNOSIS — H04.123 DRY EYE SYNDROME OF BOTH EYES: ICD-10-CM

## 2025-03-20 DIAGNOSIS — H43.813 POSTERIOR VITREOUS DETACHMENT OF BOTH EYES: ICD-10-CM

## 2025-03-25 ENCOUNTER — TELEPHONE (OUTPATIENT)
Dept: OPHTHALMOLOGY | Facility: CLINIC | Age: 75
End: 2025-03-25
Payer: COMMERCIAL

## 2025-04-01 NOTE — PROGRESS NOTES
Amalia Britton is a 74 year old female with the following diagnoses:   1. Transient visual loss of both eyes    2. Visual field defect    3. Vitreous hemorrhage of right eye (H)    4. Posterior vitreous detachment of both eyes    5. Dry eye syndrome of both eyes    6. Long term current use of anticoagulant therapy       Patient was sent for consultation by Dr. Lewis for transient vision loss in both eyes    HPI:    Patient is a 74-year-old with a history of left internal carotid artery brain aneurysm noted back in 2015 after she had a severe headache and underwent an angiography. In 10/2024 she started to fall down and was diagnosed with DVT's. She fell down and hit her face and then had another fall, developed vertebral fracture. She was placed on Elliquis.     A few weeks ( in January 2025) after starting Elliquis, she started to slowly have vision problems ( blurry vision in both eyes, could not push the cart). This happened suddenly and it got better. She also saw black dots in both eye. This went away.  This resolved in 5 hours. A few days later, this happened again in both eyes and it resolved in 6.5 hours. If she looked in front of her, she would see blurry. This happened 25-30 times.The only thing she saw after these episodes was big tails and floaters. She also had headaches but is not sure if they are related. She also has sensitivity to light. Denies any nausea or vomiting.  It is similar to wearing the wrong glasses. She cannot watch TV, use her phone, or turn on the stove, she cannot recognize a face. This is always both eyes at the same time.      She denies seeing any flashes or floaters.     2 weeks later, she saw Dr. Shields, diagnosed her with vitreous hemorrhage in both eyes. Patient was seen by a retina surgeon was told that she still has vitreous heme. On her follow up, she still had vitreous heme.    Patient thinks her symptoms are getting worse. She is concerned about this. Patient is  also scheduled for surgery, right eye. She still see's the floaters in both eyes, more right eye.     Had a history of migraines     Ocular history: bilateral vitreous heme    Drops: None    Independent historians:  Patient    Review of outside testing:    CT ANGIO HEAD 2/19/2025  Impression    1. No large vessel occlusion.    2. Unchanged 3mm left lateral paraclinoid ICA aneurysm. A follow-up MRA of the head in August 2030, 15 years after initial discovery, will be obtained.    CT Head w/o Contrast 2/18/2025   Impression          IMPRESSION:1.  No CT evidence of acute cortical infarct. No acute intracranial hemorrhage. No other acute intracranial findings.    Review of outside clinical notes:    3/20/2025 -- Visit with Dr. Lewis       Past medical history:  Patient Active Problem List   Diagnosis    Anemia, unspecified    Impaired intestinal absorption    Postsurgical nonabsorption    Need for prophylactic immunotherapy    Adverse effect of iron or its compound    Status post total left knee replacement    History of total right knee replacement   LEFT ICA aneurism     Medications:   Current Outpatient Medications   Medication Sig Dispense Refill    levothyroxine (SYNTHROID) 137 MCG tablet Synthroid      omeprazole 20 MG tablet 1 capsule.      sertraline (ZOLOFT) 100 MG tablet Take 200 mg by mouth daily (2 X 100 mg)      topiramate (TOPAMAX) 50 MG tablet Take 100 mg by mouth 2 times daily (2 X 50 mg)      VITAMIN D, CHOLECALCIFEROL, PO Take 2,000 Units by mouth every other day       aspirin (ASA) 325 MG EC tablet Take 1 tablet (325 mg) by mouth daily 30 tablet 0    celecoxib (CELEBREX) 200 MG capsule Take 1 capsule (200 mg) by mouth daily for 14 days Do not take within 6 hours of ibuprofen (MOTRIN, ADVIL) or ketorolac (TORADOL) if prescribed. 14 capsule 0    hydrOXYzine (ATARAX) 10 MG tablet Take 1 tablet (10 mg) by mouth every 6 hours as needed for itching or anxiety (with pain, moderate pain) 30 tablet 0     levothyroxine (SYNTHROID/LEVOTHROID) 150 MCG tablet Take 150 mcg by mouth every morning      multivitamin, therapeutic with minerals (MULTI-VITAMIN) TABS Take 1 tablet by mouth daily      oxyCODONE (ROXICODONE) 5 MG tablet Take 1-2 tablets (5-10 mg) by mouth every 4 hours as needed 60 tablet 0    pantoprazole (PROTONIX) 40 MG EC tablet Take 40 mg by mouth 2 times daily      senna-docusate (SENOKOT-S/PERICOLACE) 8.6-50 MG tablet Take 1-2 tablets by mouth 2 times daily Take while on oral narcotics to prevent or treat constipation. 30 tablet 0    traZODone (DESYREL) 100 MG tablet Take 200 mg by mouth At Bedtime (2 x 100mg = 200mg) (Patient not taking: Reported on 2025)      triamcinolone (NASACORT) 55 MCG/ACT nasal aerosol Spray 1 spray in nostril.      vitamin B-12 (CYANOCOBALAMIN) 1000 MCG tablet Take 1,000 mcg by mouth every other day        No current facility-administered medications for this visit.     Abdias     Family history / social history:  Patient's that  at the age of 43 because of rheumatic heart fever, also 6 aunts  because of aneurysms (5 brain aneurysms and 1 aortic aneurysm)    Patient  reports that she quit smoking about 40 years ago. Her smoking use included cigarettes. She started smoking about 58 years ago. She has a 18 pack-year smoking history. She has never used smokeless tobacco. She reports current alcohol use. She reports that she does not use drugs.     Exam:  Visual acuity 20/40 -2  right eye 20/20 -1  left eye.  Color vision is full in both eyes.  Pupils: no APD, round and reactive.  Intraocular pressure 15 right eye and 15 left eye.  Anterior segment exam +3 RBC in the AC right eye and + 2 left eye, +3 Vitreous heme right eye and + 1 left eye   Fundus exam was unremarkable     Tests ordered and interpreted today:    Glaucoma Top OU          Right Eye  Reliability of the test: Good   . Test Findings Free Text: Superior visual field defect   . Interpretation: Abnormal   .  Interval: Initial   .     Left Eye  Reliability of the test: Good   . Findings: Altitudinal defect   . Test Findings Free Text: Supeiror and partial infeiror   . Interpretation: Abnormal   . Interval: Initial   .          OCT Optic Nerve RNFL Spectralis OU (both eyes)          Performed by: TL   .     Right Eye  Reliability of the test: Good   . Test Findings: Normal   . Interpretation: Normal   . Plan: Monitor   . Interval: Initial   .     Left Eye  Reliability of the test: Good   . Test Findings: Normal   . Interpretation: Normal   . Plan: Monitor   . Interval: Initial   .                Assessment/Plan:   She has had about 30 episodes of bilateral, simultaneous severe blurring of the vision.  These episodes take about 2 minutes to go from normal to the marcie.  They last several hours at a time and then resolve over the course of about 1 hour.  It is always both eyes and she describes it as being as if she is wearing the wrong glasses.  This description is not consistent with a transient ischemic attack.  The visual field is affected throughout the vision in both eyes.  It is not homonymous in her description.  In addition blurred vision is not a description of ischemia.  The differential diagnosis would include migraine aura, dry eye syndrome, intermittent angle-closure, uveitis glaucoma hyphema syndrome.  Her anterior chamber looks very deep and it would be unusual to have bilateral simultaneous angle-closure glaucoma.  There are no transillumination defects and her intraocular lens appears to be in a good position.  I think it be highly unusual to have bilateral simultaneous U GH syndrome.  Dry eye syndrome is a consideration, however the severity of the blurred vision as well as the stereotypic nature of her symptoms sounds highly unusual for this.  I think the best fit diagnosis would be some form of migraine aura without headache.  Her description is not ideal for this entity, but it seems to be the best  fit.  Since her symptoms last several hours at a time, I think the best thing would be for her to come in while it is taking place and we can assess her vision, check her intraocular pressure, and examine her to determine if we can come up with a diagnosis.  In the meantime, I have asked her to look for a trigger to see if there is something that could be causing these episodes to occur.  She states that she has been under a great deal of stress recently and this may be contributing to her symptoms.  I do not believe that this is related to her vitreous hemorrhages or anterior chamber cell.            Attending Physician Attestation:  Complete documentation of historical and exam elements from today's encounter can be found in the full encounter summary report (not reduplicated in this progress note).  I personally obtained the chief complaint(s) and history of present illness.  I confirmed and edited as necessary the review of systems, past medical/surgical history, family history, social history, and examination findings as documented by others; and I examined the patient myself.  I personally reviewed the relevant tests, images, and reports as documented above.  I formulated and edited as necessary the assessment and plan and discussed the findings and management plan with the patient and family. I personally reviewed the ophthalmic test(s) associated with this encounter, agree with the interpretation(s) as documented by the resident/fellow, and have edited the corresponding report(s) as necessary.  - Familia Wing MD   Fellow, Neuro-Ophthalmology

## 2025-04-02 ENCOUNTER — PRE VISIT (OUTPATIENT)
Dept: OPHTHALMOLOGY | Facility: CLINIC | Age: 75
End: 2025-04-02

## 2025-04-02 ENCOUNTER — OFFICE VISIT (OUTPATIENT)
Dept: OPHTHALMOLOGY | Facility: CLINIC | Age: 75
End: 2025-04-02
Payer: MEDICARE

## 2025-04-02 DIAGNOSIS — H43.813 POSTERIOR VITREOUS DETACHMENT OF BOTH EYES: ICD-10-CM

## 2025-04-02 DIAGNOSIS — H53.40 VISUAL FIELD DEFECT: ICD-10-CM

## 2025-04-02 DIAGNOSIS — H04.123 DRY EYE SYNDROME OF BOTH EYES: ICD-10-CM

## 2025-04-02 DIAGNOSIS — H43.11 VITREOUS HEMORRHAGE OF RIGHT EYE (H): ICD-10-CM

## 2025-04-02 DIAGNOSIS — H53.123 TRANSIENT VISUAL LOSS OF BOTH EYES: Primary | ICD-10-CM

## 2025-04-02 DIAGNOSIS — Z79.01 LONG TERM CURRENT USE OF ANTICOAGULANT THERAPY: ICD-10-CM

## 2025-04-02 RX ORDER — LEVOTHYROXINE SODIUM 137 UG/1
TABLET ORAL
COMMUNITY

## 2025-04-02 RX ORDER — TRIAMCINOLONE ACETONIDE 55 UG/1
1 SPRAY, METERED NASAL
COMMUNITY

## 2025-04-02 ASSESSMENT — VISUAL ACUITY
OD_CC: 20/40
OS_CC: 20/20
OD_CC+: +1
OS_CC+: -1
METHOD: SNELLEN - LINEAR
CORRECTION_TYPE: GLASSES

## 2025-04-02 ASSESSMENT — REFRACTION_WEARINGRX
OS_SPHERE: -1.00
OS_ADD: +2.50
OD_SPHERE: -2.25
OD_CYLINDER: +1.25
OS_AXIS: 084
OD_ADD: +2.50
OD_AXIS: 010
SPECS_TYPE: PAL
OS_CYLINDER: +1.25

## 2025-04-02 ASSESSMENT — CONF VISUAL FIELD
OD_SUPERIOR_TEMPORAL_RESTRICTION: 0
OD_INFERIOR_NASAL_RESTRICTION: 0
OS_NORMAL: 1
OD_SUPERIOR_NASAL_RESTRICTION: 0
OS_INFERIOR_TEMPORAL_RESTRICTION: 0
METHOD: COUNTING FINGERS
OD_NORMAL: 1
OD_INFERIOR_TEMPORAL_RESTRICTION: 0
OS_SUPERIOR_NASAL_RESTRICTION: 0
OS_INFERIOR_NASAL_RESTRICTION: 0
OS_SUPERIOR_TEMPORAL_RESTRICTION: 0

## 2025-04-02 ASSESSMENT — TONOMETRY
IOP_METHOD: ICARE
OS_IOP_MMHG: 15
OD_IOP_MMHG: 15

## 2025-04-02 ASSESSMENT — CUP TO DISC RATIO
OD_RATIO: 0.1
OS_RATIO: 0.2

## 2025-04-02 ASSESSMENT — SLIT LAMP EXAM - LIDS
COMMENTS: NORMAL
COMMENTS: NORMAL

## 2025-04-02 ASSESSMENT — EXTERNAL EXAM - LEFT EYE: OS_EXAM: NORMAL

## 2025-04-02 ASSESSMENT — EXTERNAL EXAM - RIGHT EYE: OD_EXAM: NORMAL

## 2025-04-02 NOTE — LETTER
2025     RE:  :  MRN: Amalia Britton  1950  [id number]     Dear Dr. De Oliveira:     Thank you for asking me to see your very pleasant patient,Amlaia Britton, in neuro-ophthalmic consultation.  I would like to thank you for sending your records and I have summarized them in the history of present illness.   My assessment and plan are below.  For further details, please see my attached clinic note.      Amalia Britton is a 74 year old female with the following diagnoses:   1. Transient visual loss of both eyes    2. Visual field defect    3. Vitreous hemorrhage of right eye (H)    4. Posterior vitreous detachment of both eyes    5. Dry eye syndrome of both eyes    6. Long term current use of anticoagulant therapy       Patient was sent for consultation by Dr. Lewis for transient vision loss in both eyes    HPI:    Patient is a 74-year-old with a history of left internal carotid artery brain aneurysm noted back in  after she had a severe headache and underwent an angiography. In 10/2024 she started to fall down and was diagnosed with DVT's. She fell down and hit her face and then had another fall, developed vertebral fracture. She was placed on Elliquis.     A few weeks ( in 2025) after starting Elliquis, she started to slowly have vision problems ( blurry vision in both eyes, could not push the cart). This happened suddenly and it got better. She also saw black dots in both eye. This went away.  This resolved in 5 hours. A few days later, this happened again in both eyes and it resolved in 6.5 hours. If she looked in front of her, she would see blurry. This happened 25-30 times.The only thing she saw after these episodes was big tails and floaters. She also had headaches but is not sure if they are related. She also has sensitivity to light. Denies any nausea or vomiting.  It is similar to wearing the wrong glasses. She cannot watch TV, use her phone, or turn on the stove, she cannot recognize a  face. This is always both eyes at the same time.      She denies seeing any flashes or floaters.     2 weeks later, she saw Dr. Shields, diagnosed her with vitreous hemorrhage in both eyes. Patient was seen by a retina surgeon was told that she still has vitreous heme. On her follow up, she still had vitreous heme.    Patient thinks her symptoms are getting worse. She is concerned about this. Patient is also scheduled for surgery, right eye. She still see's the floaters in both eyes, more right eye.     Had a history of migraines     Ocular history: bilateral vitreous heme    Drops: None    Independent historians:  Patient    Review of outside testing:    CT ANGIO HEAD 2/19/2025  Impression    1. No large vessel occlusion.    2. Unchanged 3mm left lateral paraclinoid ICA aneurysm. A follow-up MRA of the head in August 2030, 15 years after initial discovery, will be obtained.    CT Head w/o Contrast 2/18/2025   Impression          IMPRESSION:1.  No CT evidence of acute cortical infarct. No acute intracranial hemorrhage. No other acute intracranial findings.    Review of outside clinical notes:    3/20/2025 -- Visit with Dr. Lewis       Past medical history:  Patient Active Problem List   Diagnosis    Anemia, unspecified    Impaired intestinal absorption    Postsurgical nonabsorption    Need for prophylactic immunotherapy    Adverse effect of iron or its compound    Status post total left knee replacement    History of total right knee replacement   LEFT ICA aneurism     Medications:   Current Outpatient Medications   Medication Sig Dispense Refill    levothyroxine (SYNTHROID) 137 MCG tablet Synthroid      omeprazole 20 MG tablet 1 capsule.      sertraline (ZOLOFT) 100 MG tablet Take 200 mg by mouth daily (2 X 100 mg)      topiramate (TOPAMAX) 50 MG tablet Take 100 mg by mouth 2 times daily (2 X 50 mg)      VITAMIN D, CHOLECALCIFEROL, PO Take 2,000 Units by mouth every other day       aspirin (ASA) 325 MG EC tablet  Take 1 tablet (325 mg) by mouth daily 30 tablet 0    celecoxib (CELEBREX) 200 MG capsule Take 1 capsule (200 mg) by mouth daily for 14 days Do not take within 6 hours of ibuprofen (MOTRIN, ADVIL) or ketorolac (TORADOL) if prescribed. 14 capsule 0    hydrOXYzine (ATARAX) 10 MG tablet Take 1 tablet (10 mg) by mouth every 6 hours as needed for itching or anxiety (with pain, moderate pain) 30 tablet 0    levothyroxine (SYNTHROID/LEVOTHROID) 150 MCG tablet Take 150 mcg by mouth every morning      multivitamin, therapeutic with minerals (MULTI-VITAMIN) TABS Take 1 tablet by mouth daily      oxyCODONE (ROXICODONE) 5 MG tablet Take 1-2 tablets (5-10 mg) by mouth every 4 hours as needed 60 tablet 0    pantoprazole (PROTONIX) 40 MG EC tablet Take 40 mg by mouth 2 times daily      senna-docusate (SENOKOT-S/PERICOLACE) 8.6-50 MG tablet Take 1-2 tablets by mouth 2 times daily Take while on oral narcotics to prevent or treat constipation. 30 tablet 0    traZODone (DESYREL) 100 MG tablet Take 200 mg by mouth At Bedtime (2 x 100mg = 200mg) (Patient not taking: Reported on 2025)      triamcinolone (NASACORT) 55 MCG/ACT nasal aerosol Spray 1 spray in nostril.      vitamin B-12 (CYANOCOBALAMIN) 1000 MCG tablet Take 1,000 mcg by mouth every other day        No current facility-administered medications for this visit.     Abdias     Family history / social history:  Patient's that  at the age of 43 because of rheumatic heart fever, also 6 aunts  because of aneurysms (5 brain aneurysms and 1 aortic aneurysm)    Patient  reports that she quit smoking about 40 years ago. Her smoking use included cigarettes. She started smoking about 58 years ago. She has a 18 pack-year smoking history. She has never used smokeless tobacco. She reports current alcohol use. She reports that she does not use drugs.     Exam:  Visual acuity 20/40 -2  right eye 20/20 -1  left eye.  Color vision is full in both eyes.  Pupils: no APD, round and  reactive.  Intraocular pressure 15 right eye and 15 left eye.  Anterior segment exam +3 RBC in the AC right eye and + 2 left eye, +3 Vitreous heme right eye and + 1 left eye   Fundus exam was unremarkable     Tests ordered and interpreted today:    Glaucoma Top OU          Right Eye  Reliability of the test: Good   . Test Findings Free Text: Superior visual field defect   . Interpretation: Abnormal   . Interval: Initial   .     Left Eye  Reliability of the test: Good   . Findings: Altitudinal defect   . Test Findings Free Text: Supeiror and partial infeiror   . Interpretation: Abnormal   . Interval: Initial   .          OCT Optic Nerve RNFL Spectralis OU (both eyes)          Performed by: TL   .     Right Eye  Reliability of the test: Good   . Test Findings: Normal   . Interpretation: Normal   . Plan: Monitor   . Interval: Initial   .     Left Eye  Reliability of the test: Good   . Test Findings: Normal   . Interpretation: Normal   . Plan: Monitor   . Interval: Initial   .                Assessment/Plan:   She has had about 30 episodes of bilateral, simultaneous severe blurring of the vision.  These episodes take about 2 minutes to go from normal to the marcie.  They last several hours at a time and then resolve over the course of about 1 hour.  It is always both eyes and she describes it as being as if she is wearing the wrong glasses.  This description is not consistent with a transient ischemic attack.  The visual field is affected throughout the vision in both eyes.  It is not homonymous in her description.  In addition blurred vision is not a description of ischemia.  The differential diagnosis would include migraine aura, dry eye syndrome, intermittent angle-closure, uveitis glaucoma hyphema syndrome.  Her anterior chamber looks very deep and it would be unusual to have bilateral simultaneous angle-closure glaucoma.  There are no transillumination defects and her intraocular lens appears to be in a good  position.  I think it be highly unusual to have bilateral simultaneous U GH syndrome.  Dry eye syndrome is a consideration, however the severity of the blurred vision as well as the stereotypic nature of her symptoms sounds highly unusual for this.  I think the best fit diagnosis would be some form of migraine aura without headache.  Her description is not ideal for this entity, but it seems to be the best fit.  Since her symptoms last several hours at a time, I think the best thing would be for her to come in while it is taking place and we can assess her vision, check her intraocular pressure, and examine her to determine if we can come up with a diagnosis.  In the meantime, I have asked her to look for a trigger to see if there is something that could be causing these episodes to occur.  She states that she has been under a great deal of stress recently and this may be contributing to her symptoms.  I do not believe that this is related to her vitreous hemorrhages or anterior chamber cell.          Again, thank you for allowing me to participate in the care of your patient.      Sincerely,    Familia Parekh MD  Professor  Director of Neuro-Ophthalmology  [facility] [facility] [employer] Endowed Chair  Departments of Ophthalmology, Neurology, and Neurosurgery  [facility] [address] [place] [address]  [id number]  T - [phone]  F - [phone]  [email or web address]      CC: Niko De Oliveira MD  [facility]  [address] [place] [address]  Via Fax: [phone]    DX = transient vision loss both eyes

## 2025-04-13 ENCOUNTER — HEALTH MAINTENANCE LETTER (OUTPATIENT)
Age: 75
End: 2025-04-13

## (undated) DEVICE — SOL NACL 0.9% IRRIG 1000ML BOTTLE 2F7124

## (undated) DEVICE — DRSG AQUACEL AG 3.5X9.75" HYDROFIBER 412011

## (undated) DEVICE — BONE WAX 2.5GM W31G

## (undated) DEVICE — CAST PADDING 6" UNSTERILE 9046

## (undated) DEVICE — DRSG GAUZE 4X4" 3033

## (undated) DEVICE — BLADE SAW SAGITTAL STRK 18X90X1.37MM HD SYS 6 6118-137-090

## (undated) DEVICE — PREP CHLORAPREP 26ML TINTED ORANGE  260815

## (undated) DEVICE — DRSG ABDOMINAL 07 1/2X8" 7197D

## (undated) DEVICE — DRSG XEROFORM 5X9" 8884431605

## (undated) DEVICE — WRAP EZY KNEE

## (undated) DEVICE — SOL WATER IRRIG 1000ML BOTTLE 2F7114

## (undated) DEVICE — PACK TOTAL KNEE SOP15TKFSD

## (undated) DEVICE — SU VICRYL 2-0 CT-1 27" UND J259H

## (undated) DEVICE — GLOVE PROTEXIS BLUE W/NEU-THERA 8.0  2D73EB80

## (undated) DEVICE — HOOD FLYTE W/PEELAWAY 408-800-100

## (undated) DEVICE — SOL NACL 0.9% IRRIG 1000ML BOTTLE 07138-09

## (undated) DEVICE — GLOVE PROTEXIS POWDER FREE 7.5 ORTHOPEDIC 2D73ET75

## (undated) DEVICE — SU VICRYL 0 CT-1 27" J340H

## (undated) DEVICE — NDL SPINAL 18GA 3.5" 405184

## (undated) DEVICE — GLOVE PROTEXIS BLUE W/NEU-THERA 7.0  2D73EB70

## (undated) DEVICE — DRAPE STERI U 1015

## (undated) DEVICE — LINEN TOWEL PACK X5 5464

## (undated) DEVICE — BONE CEMENT MIXEVAC III HI VAC KIT  0206-015-000

## (undated) DEVICE — NDL 22GA 1" 305155

## (undated) DEVICE — SYR 30ML LL W/O NDL 302832

## (undated) DEVICE — GLOVE PROTEXIS W/NEU-THERA 7.0  2D73TE70

## (undated) DEVICE — ESU GROUND PAD UNIVERSAL W/O CORD

## (undated) DEVICE — SU ETHIBOND 1 CT-1 30" X425H

## (undated) DEVICE — BONE CLEANING TIP INTERPULSE  0210-010-000

## (undated) DEVICE — DRAPE STERI TOWEL LG 1010

## (undated) DEVICE — IMM KNEE 20" 0814-2660

## (undated) DEVICE — CAST PADDING 4" COTTON WEBRIL UNSTERILE 9084

## (undated) DEVICE — MANIFOLD NEPTUNE 4 PORT 700-20

## (undated) DEVICE — BNDG COBAN 6"X5YDS STERILE

## (undated) DEVICE — SUCTION TIP YANKAUER STR K87

## (undated) DEVICE — CAST PADDING 6" STERILE 9046S

## (undated) DEVICE — SOLUTION WOUND CLEANSING 3/4OZ 10% PVP EA-L3011FB-50

## (undated) DEVICE — BLADE SAW SAGITTAL 18.5X63X0.64MM 4/2000 SYS 2108-120-006

## (undated) DEVICE — BLADE KNIFE SURG 10 371110

## (undated) DEVICE — SOL NACL 0.9% INJ 1000ML BAG 2B1324X

## (undated) DEVICE — DRAPE SHEET REV FOLD 3/4 9349

## (undated) DEVICE — DRSG ADAPTIC 3X8" 6113

## (undated) DEVICE — SUCTION IRR SYSTEM W/O TIP INTERPULSE HANDPIECE 0210-100-000

## (undated) RX ORDER — LIDOCAINE HYDROCHLORIDE 20 MG/ML
INJECTION, SOLUTION EPIDURAL; INFILTRATION; INTRACAUDAL; PERINEURAL
Status: DISPENSED
Start: 2021-10-14

## (undated) RX ORDER — PROPOFOL 10 MG/ML
INJECTION, EMULSION INTRAVENOUS
Status: DISPENSED
Start: 2021-10-14

## (undated) RX ORDER — DEXAMETHASONE SODIUM PHOSPHATE 4 MG/ML
INJECTION, SOLUTION INTRA-ARTICULAR; INTRALESIONAL; INTRAMUSCULAR; INTRAVENOUS; SOFT TISSUE
Status: DISPENSED
Start: 2021-10-14

## (undated) RX ORDER — HYDROMORPHONE HYDROCHLORIDE 1 MG/ML
INJECTION, SOLUTION INTRAMUSCULAR; INTRAVENOUS; SUBCUTANEOUS
Status: DISPENSED
Start: 2017-06-15

## (undated) RX ORDER — CELECOXIB 200 MG/1
CAPSULE ORAL
Status: DISPENSED
Start: 2021-10-14

## (undated) RX ORDER — OXYCODONE HCL 10 MG/1
TABLET, FILM COATED, EXTENDED RELEASE ORAL
Status: DISPENSED
Start: 2021-10-14

## (undated) RX ORDER — HYDROMORPHONE HCL IN WATER/PF 6 MG/30 ML
PATIENT CONTROLLED ANALGESIA SYRINGE INTRAVENOUS
Status: DISPENSED
Start: 2021-10-14

## (undated) RX ORDER — ACETAMINOPHEN 10 MG/ML
INJECTION, SOLUTION INTRAVENOUS
Status: DISPENSED
Start: 2017-06-15

## (undated) RX ORDER — ACETAMINOPHEN 325 MG/1
TABLET ORAL
Status: DISPENSED
Start: 2021-10-14

## (undated) RX ORDER — ONDANSETRON 2 MG/ML
INJECTION INTRAMUSCULAR; INTRAVENOUS
Status: DISPENSED
Start: 2021-10-14

## (undated) RX ORDER — FENTANYL CITRATE 0.05 MG/ML
INJECTION, SOLUTION INTRAMUSCULAR; INTRAVENOUS
Status: DISPENSED
Start: 2021-10-14

## (undated) RX ORDER — PROPOFOL 10 MG/ML
INJECTION, EMULSION INTRAVENOUS
Status: DISPENSED
Start: 2017-06-15

## (undated) RX ORDER — LIDOCAINE HYDROCHLORIDE 20 MG/ML
INJECTION, SOLUTION EPIDURAL; INFILTRATION; INTRACAUDAL; PERINEURAL
Status: DISPENSED
Start: 2017-06-15

## (undated) RX ORDER — LIDOCAINE HYDROCHLORIDE 10 MG/ML
INJECTION, SOLUTION EPIDURAL; INFILTRATION; INTRACAUDAL; PERINEURAL
Status: DISPENSED
Start: 2021-10-14

## (undated) RX ORDER — FENTANYL CITRATE 50 UG/ML
INJECTION, SOLUTION INTRAMUSCULAR; INTRAVENOUS
Status: DISPENSED
Start: 2021-10-14

## (undated) RX ORDER — FENTANYL CITRATE 50 UG/ML
INJECTION, SOLUTION INTRAMUSCULAR; INTRAVENOUS
Status: DISPENSED
Start: 2017-06-15

## (undated) RX ORDER — DEXAMETHASONE SODIUM PHOSPHATE 4 MG/ML
INJECTION, SOLUTION INTRA-ARTICULAR; INTRALESIONAL; INTRAMUSCULAR; INTRAVENOUS; SOFT TISSUE
Status: DISPENSED
Start: 2017-06-15

## (undated) RX ORDER — ONDANSETRON 2 MG/ML
INJECTION INTRAMUSCULAR; INTRAVENOUS
Status: DISPENSED
Start: 2017-06-15

## (undated) RX ORDER — TRANEXAMIC ACID 650 MG/1
TABLET ORAL
Status: DISPENSED
Start: 2021-10-14

## (undated) RX ORDER — CEFAZOLIN SODIUM 2 G/100ML
INJECTION, SOLUTION INTRAVENOUS
Status: DISPENSED
Start: 2017-06-15

## (undated) RX ORDER — CEFAZOLIN SODIUM 2 G/100ML
INJECTION, SOLUTION INTRAVENOUS
Status: DISPENSED
Start: 2021-10-14